# Patient Record
Sex: MALE | Race: WHITE | Employment: OTHER | URBAN - METROPOLITAN AREA
[De-identification: names, ages, dates, MRNs, and addresses within clinical notes are randomized per-mention and may not be internally consistent; named-entity substitution may affect disease eponyms.]

---

## 2017-03-24 ENCOUNTER — HOSPITAL ENCOUNTER (OUTPATIENT)
Dept: PHYSICAL THERAPY | Age: 72
Discharge: HOME | End: 2017-03-24
Payer: MEDICARE

## 2017-03-24 PROCEDURE — 97530 THERAPEUTIC ACTIVITIES: CPT

## 2017-03-24 PROCEDURE — G8984 CARRY CURRENT STATUS: HCPCS

## 2017-03-24 PROCEDURE — 97161 PT EVAL LOW COMPLEX 20 MIN: CPT

## 2017-03-24 PROCEDURE — 97110 THERAPEUTIC EXERCISES: CPT

## 2017-03-24 PROCEDURE — G8985 CARRY GOAL STATUS: HCPCS

## 2017-03-24 NOTE — PROGRESS NOTES
[address] PHYSICAL THERAPY  [address] [place] [address] Kitty Andrew, [address] - Phone: [phone]  Fax: [phone] / 9772 [facility]  Patient Name: Ninoska Walls :    Medical   Diagnosis: Right shoulder pain [M25.511] Treatment Diagnosis: R RTC strain/ impingement   Onset Date: 3/7/2017 AGE: 66 y.o. Referral Source: Vin Rosa MD [facility]): 3/24/2017   Prior Hospitalization: See medical history Provider #: [id number]   Prior Level of Function: Independent w/o R shoulder pain   Comorbidities: Quad bi-pass surgery , L shoulder arthritis, asthma   Medications: Verified on Patient Summary List   The Plan of Care and following information is based on the information from the initial evaluation.   =======================================================================================  Assessment / key information:  Patient is a 66 y.o. male who presents with chief complaint of R shoulder pain. S/S consistent with R RTC pathology. Patient reports he suffered the injury on 3/7/17 when slipping down the steps, a friend grabbed his arm and jerked it to keep him from falling; patient unsure if he fell onto the shoulder. During evaluation, patient demonstrates R shoulder AROM/PROM grossly WNL and symmetrical to L shoulder with pain at eng range in all directions and dec RUE strength. Pertinent positives include + empty can, + shrug sign indicating RTC pathology. Patient's FOTO functional score is 57/100 indicating 43% limitation in functional use of RUE. Patient would benefit from skilled PT services to address these issues and improve RTC strength, progress ROM interventions and address scapulothoracic and postural strengthening within non-painful range of motion.   Thank you for this referral       Objective Data:    Shoulder ROM        AROM         PROM    Left Right   Left Right Flexion 145 142 Flexion 148 153   Extension 65 65 Extension NT NT   Scaption/ 88 Scaption/ 160   ER @ 0 Degrees 51 70 ER @ 0 Degrees 58 88   ER @ 90 Degrees 65 68 ER @ 90 Degrees 75 72   IR @ 90 Degrees 50 60 IR @ 90 Degrees 58 71   IR HBB L1 S1         Functional ER                ======================================================================================  Eval Complexity: History: HIGH Complexity :3+ comorbidities / personal factors will impact the outcome/ POC Exam:MEDIUM Complexity : 3 Standardized tests and measures addressing body structure, function, activity limitation and / or participation in recreation  Presentation: LOW Complexity : Stable, uncomplicated  Clinical Decision Making:MEDIUM Complexity : FOTO score of 26-74Overall Complexity:LOW   Problem List: pain affecting function, decrease ROM, decrease strength, edema affecting function, impaired gait/ balance, decrease ADL/ functional abilitiies, decrease activity tolerance, decrease flexibility/ joint mobility and decrease transfer abilities   Treatment Plan may include any combination of the following: Therapeutic exercise, Therapeutic activities, Neuromuscular re-education, Physical agent/modality, Manual therapy, Patient education, Self Care training, Functional mobility training and Home safety training  Patient / Family readiness to learn indicated by: asking questions, trying to perform skills and interest  Persons(s) to be included in education: patient (P)  Barriers to Learning/Limitations: None  Measures taken:    Patient Goal (s): To improve ROM and strength   Patient self reported health status: good  Rehabilitation Potential: good   Short Term Goals: To be accomplished in  3  Weeks:  1. Patient will be compliant with HEP in order to facilitate progression of therapeutic exercise and improve mobility  2.  Patient will improve R shoulder scaption to >/= 100 degrees with pain no greater than 3/10 in order to improve ability to perform overhead activities  3. Patient will report GROC >/= +2 in order to demonstrate meaningful improvement in RUE function.  Long Term Goals: To be accomplished in  6  Weeks:  1. Patient will be independent with HEP in order to demonstrate ability to perform therapeutic exercises and continue progressing functional mobility upon D/C  2. Patient will demonstrate R shoulder scaption of 140 degrees with pain no greater than 1/10 in order to improve ability to perform overhead activities. 3. Patient will improve FOTO score to 70/100 to demonstrate a meaningful improvement in functional mobility and increased quality of life      Frequency / Duration:   Patient to be seen  2  times per week for 4-6  weeks:  Patient / Caregiver education and instruction: self care, activity modification and exercises  G-Codes (GP): Carry [id number] Current  CK= 40-59%    Goal  CJ= 20-39%. The severity rating is based on the FOTO Score  Therapist Signature: Tj Chopra DPT Date: 3/07/6207   Certification Period: 3/24/17 - 6/23/17 Time: 10:54 AM   ===========================================================================================  I certify that the above Physical Therapy Services are being furnished while the patient is under my care. I agree with the treatment plan and certify that this therapy is necessary. Physician Signature:        Date:       Time:     Please sign and return to [employer] or you may fax the signed copy to [phone]. Thank you.

## 2017-03-24 NOTE — PROGRESS NOTES
PHYSICAL THERAPY - DAILY TREATMENT NOTE    Patient Name: Ras Vega        Date: 3/24/2017  : 1945   YES Patient  Verified  Visit #:   1     Insurance: Payor: Diane Matthew / Plan: [place] MED[place] PART [place] & B / Product Type: Medicare /      In time: 9:05 Out time: 10:05   Total Treatment Time: 60 min     Medicare Time Tracking (below)   Total Timed Codes (min):  20 1:1 Treatment Time:  60     TREATMENT AREA =  R shoulder pain    SUBJECTIVE     Pain Level (on 0 to 10 scale):  0-6/7  / 10   Medication Changes/New allergies or changes in medical history, any new surgeries or procedures? NO    If yes, update Summary List   Subjective Functional Status/Changes:  []  No changes reported     Patient's main complaint: Patient was about to fall down a set of steps 3/7/2017 (denies frequent falls) and someone grabbed his arm to stop him from falling. States he does not recall the incident but states he did not hit his head. Patient was taken to the hospital and stated possible hairline fracture of R shoulder. Patient waited a week and a half then called for orthopedic appointment. Patient had cortisone shot in R shoulder that helped for a couple days and then worse off. Patient reports L shoulder arthritis as well. What reproduces symptoms: Lifting arm, using arm  What alleviates symptoms: resting it by his side    Chief limitations:   Weights, carrying objects    Occupation: Computer work, desk work    Goals: Get arm back to being useful        OBJECTIVE    Physical Therapy Evaluation - Shoulder    A. Cervical Spine  Dermatomes WNL [x]   Comment:  C/S ROM WNL [x]  Comments:     B. Observation:    Posture: [] Poor    [] Fair    [] Good    Describe: Atrophy of muscle tissue? [] Yes  [] No  Location:    Visible Defects?  [] Yes  [] No (ecchymosis, edema, inflammation, deformities)     ROM:  [] Unable to assess at this time     Painful Arc: [x] Yes  [] No   Approximate location in ROM where pain begins:                                            AROM                                                              PROM   Left Right  Left Right   Flexion 145 142 Flexion 148 153   Extension 65 65 Extension NT NT   Scaption/ 88 Scaption/ 160   ER @ 0 Degrees 51 70 ER @ 0 Degrees 58 88   ER @ 90 Degrees 65 68 ER @ 90 Degrees 75 72   IR @ 90 Degrees 50 60 IR @ 90 Degrees 58 71   IR HBB L1 S1      Functional ER          End Feel  Firm:  [x] Yes  [] No     Accessory Motion Testing  Laxity Present? [] Yes   [] No   Direction of laxity:    Strength:   [] Unable to assess at this time                                                                            L (1-5) R (1-5) Pain   Shoulder shrug 5 5 [] Yes   [] No   Abduction 4+ 4+ [x] Yes   [] No   External Rotators 4+ 4- [x] Yes   [] No   Internal Rotators 4+ 4 [x] Yes   [] No   Supraspinatus (scapular plane) 4+ 4 [] Yes   [] No   Serratus Anterior   [] Yes   [] No   Lower Trapezius   [] Yes   [] No   Elbow Flexion 4+ 4- [x] Yes   [] No   Elbow Extension 4+ + [x] Yes   [] No     Most pain with resisted ER    Scapulohumoral Control / Rhythm:  Able to eccentrically lower with good control?  Left: [x] Yes   [] No     Right: [] Yes   [x] No    Palpation  [] Min  [x] Mod  [] Severe    Location: ttp biceps muscle belly at elbow, Lateral shoulder     Impingement:   Neer's Test  [] Pos   [x] Neg   Hawkin's Test  [] Pos   [x] Neg  Cross body adduction [] Pos   [x] Neg    Biceps:   Speed's Test  [] Pos   [x] Neg   Yergason's Test [] Pos   [x] Neg     RTC:    Empty Can  [x] Pos   [] Neg  Shrug Sign  [x] Pos   [] Neg  Drop Arm Test  [] Pos   [] Neg    AC Joint:  AC Joint  [] Pos   [x] Neg  Shear Test  [] Pos   [x] Neg     Others:   SC Joint  [] Pos   [x] Neg  Pectoral Tightness [x] Pos   [] Neg    Other Tests / Comments:     Modalities Rationale:   min [] Estim, type/location:                                      []  att     []  unatt     []  w/US     []  w/ice    [] w/heat    min []  Mechanical Traction: type/lbs                   []  pro   []  sup   []  int   []  cont    []  before manual    []  after manual    min []  Ultrasound, settings/location:      min []  Iontophoresis w/ dexamethasone, location:                                               []  take home patch       []  in clinic   10 min [x]  Ice     []  Heat    location/position: L shoulder supine    min []  Vasopneumatic Device, press/temp:     min []  Other:    [x] Skin assessment post-treatment (if applicable):    [x]  intact    []  redness- no adverse reaction     []redness - adverse reaction:      10 min Therapeutic Exercise:  [x]  See flow sheet   Rationale:      increase ROM, increase strength, improve coordination and increase proprioception to improve the patients ability to perform ADLs and overhead duties with increased ease and decreased pain     10 min Therapeutic Activity: FOTO   Rationale: To assess current functional limitations and review POC    throughout min Patient Education:  YES  Reviewed HEP   []  Progressed/Changed HEP based on: Other Objective/Functional Measures:    See objective above     Post Treatment Pain Level (on 0 to 10) scale:   0  / 10     ASSESSMENT    Assessment/Changes in Function:     Patient History: High (L shoulder arthritis, age, Hx heart surgery)  Examination (low 1-2, mod 3+, high 4+):  Moderate per objective above  Clinical Presentation (low stable or uncomplicated, mod evolving or changing, high unstable or unpredictable): Low stable  Clinical Decision Making (low , mod 26-74, high 1-25): FOTO Mod     []  See Progress Note/Recertification   Patient will continue to benefit from skilled PT services to modify and progress therapeutic interventions, address functional mobility deficits, address ROM deficits, address strength deficits, analyze and address soft tissue restrictions, analyze and cue movement patterns, analyze and modify body mechanics/ergonomics, assess and modify postural abnormalities and instruct in home and community integration to attain remaining goals. to attain remaining goals.    Progress toward goals / Updated goals:    See POC     PLAN    [x]  Upgrade activities as tolerated YES Continue plan of care   []  Discharge due to :    []  Other:      Therapist: Chidi Goodrich DPT    Date: 3/24/2017 Time: 9:07 AM

## 2017-03-28 ENCOUNTER — HOSPITAL ENCOUNTER (OUTPATIENT)
Dept: PHYSICAL THERAPY | Age: 72
Discharge: HOME | End: 2017-03-28
Payer: MEDICARE

## 2017-03-28 PROCEDURE — 97110 THERAPEUTIC EXERCISES: CPT

## 2017-03-28 PROCEDURE — 97140 MANUAL THERAPY 1/> REGIONS: CPT

## 2017-03-28 NOTE — PROGRESS NOTES
PHYSICAL THERAPY - DAILY TREATMENT NOTE    Patient Name: Cesar Duncan        Date: 3/28/2017  : 1945   yes Patient  Verified  Visit #:   2   of     Insurance: Payor: Martín Mcdonald / Plan: [place] MEDICARE PART A & B / Product Type: Medicare /      In time: 900 Out time: 757   Total Treatment Time: 54     Medicare Time Tracking (below)   Total Timed Codes (min):  45 1:1 Treatment Time:  30     TREATMENT AREA =  Right shoulder pain [M25.511]    SUBJECTIVE  Pain Level (on 0 to 10 scale):  3-4  / 10   Medication Changes/New allergies or changes in medical history, any new surgeries or procedures?    no  If yes, update Summary List   Subjective Functional Status/Changes:  []  No changes reported     \"I am sore all over from walking all over [place] this weekend\"          OBJECTIVE  Modalities Rationale:     decrease inflammation and decrease pain to improve patient's ability to carry/lift/reach/perform ADLs with ease     min [] Estim, type/location:                                      []  att     []  unatt     []  w/US     []  w/ice    []  w/heat    min []  Mechanical Traction: type/lbs                   []  pro   []  sup   []  int   []  cont    []  before manual    []  after manual    min []  Ultrasound, settings/location:      min []  Iontophoresis w/ dexamethasone, location:                                               []  take home patch       []  in clinic   10 min [x]  Ice     []  Heat    location/position: Supine with wedge under B LEs      min []  Vasopneumatic Device, press/temp:     min []  Other:    [x] Skin assessment post-treatment (if applicable):    [x]  intact    []  redness- no adverse reaction     []redness - adverse reaction:        45/20 min Therapeutic Exercise:  [x]  See flow sheet   Rationale:      increase ROM and increase strength to improve the patients ability to carry/lift/reach/perform ADLs with ease       10 min Manual Therapy: Post/inf glides to GHJ grade III>IV  IR/ER punches  rhythmic stabs in all planes   STM to R UT/post cap/ pecs/ scap border   Rationale:      decrease pain, increase ROM, increase tissue extensibility, decrease trigger points and increase postural awareness to improve patient's ability to carry/lift/reach/perform ADLs with ease     min Patient Education:  yes  Reviewed HEP   []  Progressed/Changed HEP based on:  Pt ed on importance and benefits of compliance with HEP, core strength/stability and proper posture; pt verbalized understanding         Other Objective/Functional Measures:    VCs + demo to perform proper technique for TE  Initiated TE per flowsheet  c/o min p! at end range AAROM flex/abd; hold stding R SH AAROM abd due to pain and poor mechanics       Post Treatment Pain Level (on 0 to 10) scale:   3-4  / 10     ASSESSMENT  Assessment/Changes in Function:     Progressed there-ex without c/o increase p! []  See Progress Note/Recertification   Patient will continue to benefit from skilled PT services to modify and progress therapeutic interventions, address functional mobility deficits, address ROM deficits, address strength deficits, analyze and address soft tissue restrictions, analyze and cue movement patterns, analyze and modify body mechanics/ergonomics, assess and modify postural abnormalities and instruct in home and community integration to attain remaining goals.    Progress toward goals / Updated goals:    Pt's first visit since IE, no noted progress        PLAN  []  Upgrade activities as tolerated yes Continue plan of care   []  Discharge due to :    []  Other:      Therapist: Moe Sage PTA    Date: 3/28/2017 Time: 2:48 PM     Future Appointments  Date Time Provider Adria Tipton   3/31/2017 9:00 AM Moe Sage PTA [facility]   [facility]   4/6/2017 10:00 AM L[facility]   4/17/2017 2:30 PM [address] [place] [address]   4/20/2017 10:00 AM Moe Sage PTA [facility] 4/25/2017 11:00 AM [facility]   4/27/2017 11:30 AM [facility]   5/24/2017 9:15 AM Remedios Hodgson MD [address] [facility]

## 2017-03-31 ENCOUNTER — HOSPITAL ENCOUNTER (OUTPATIENT)
Dept: PHYSICAL THERAPY | Age: 72
Discharge: HOME | End: 2017-03-31
Payer: MEDICARE

## 2017-03-31 PROCEDURE — 97140 MANUAL THERAPY 1/> REGIONS: CPT

## 2017-03-31 PROCEDURE — 97110 THERAPEUTIC EXERCISES: CPT

## 2017-03-31 NOTE — PROGRESS NOTES
PHYSICAL THERAPY - DAILY TREATMENT NOTE    Patient Name: Kimo President        Date: 3/31/2017  : 1945   yes Patient  Verified  Visit #:   3  of     Insurance: Payor: [place] MEDICARE / Plan: [place] MEDICARE PART A & B / Product Type: Medicare /      In time: 856` Out time: 950   Total Treatment Time: 47     Medicare Time Tracking (below)   Total Timed Codes (min):  44 1:1 Treatment Time:  35     TREATMENT AREA =  Right shoulder pain [M25.511]    SUBJECTIVE  Pain Level (on 0 to 10 scale): 2-3  / 10   Medication Changes/New allergies or changes in medical history, any new surgeries or procedures?    no  If yes, update Summary List   Subjective Functional Status/Changes:  []  No changes reported     \"I am getting better, its moving up better\"         OBJECTIVE  Modalities Rationale:     decrease inflammation and decrease pain to improve patient's ability to carry/lift/reach/perform ADLs with ease     min [] Estim, type/location:                                      []  att     []  unatt     []  w/US     []  w/ice    []  w/heat    min []  Mechanical Traction: type/lbs                   []  pro   []  sup   []  int   []  cont    []  before manual    []  after manual    min []  Ultrasound, settings/location:      min []  Iontophoresis w/ dexamethasone, location:                                               []  take home patch       []  in clinic   10 min [x]  Ice     []  Heat    location/position: Seated    min []  Vasopneumatic Device, press/temp:     min []  Other:    [x] Skin assessment post-treatment (if applicable):    [x]  intact    []  redness- no adverse reaction     []redness - adverse reaction:        34/25 min Therapeutic Exercise:  [x]  See flow sheet   Rationale:      increase ROM and increase strength to improve the patients ability to carry/lift/reach/perform ADLs with ease       10 min Manual Therapy: Post/inf glides to GHJ grade III>IV  IR/ER punches  rhythmic stabs in all planes, and flex/ext/IR/ER quick reversals   STM to R UT/post cap/ pecs/ scap border   Rationale:      decrease pain, increase ROM, increase tissue extensibility, decrease trigger points and increase postural awareness to improve patient's ability to carry/lift/reach/perform ADLs with ease     min Patient Education:  yes  Reviewed HEP   []  Progressed/Changed HEP based on:  Pt ed on importance and benefits of compliance with HEP, core strength/stability and proper posture; pt verbalized understanding     Other Objective/Functional Measures:    VCs + demo to perform proper technique for TE  initiated SA punch, and SL sh abd  without c/o p!    demos good SH/scap stability without UT compensation     Post Treatment Pain Level (on 0 to 10) scale:   0  / 10     ASSESSMENT  Assessment/Changes in Function:     Progressed there-ex without c/o increase p! []  See Progress Note/Recertification   Patient will continue to benefit from skilled PT services to modify and progress therapeutic interventions, address functional mobility deficits, address ROM deficits, address strength deficits, analyze and address soft tissue restrictions, analyze and cue movement patterns, analyze and modify body mechanics/ergonomics, assess and modify postural abnormalities and instruct in home and community integration to attain remaining goals. Progress toward goals / Updated goals: · Short Term Goals: To be accomplished in 3 Weeks:  1. Patient will be compliant with HEP in order to facilitate progression of therapeutic exercise and improve mobility. MET  2. Patient will improve R shoulder scaption to >/= 100 degrees with pain no greater than 3/10 in order to improve ability to perform overhead activities  3. Patient will report GROC >/= +2 in order to demonstrate meaningful improvement in RUE function.      PLAN  []  Upgrade activities as tolerated yes Continue plan of care   []  Discharge due to :    []  Other:      Therapist: Fidel Schilling PTA Date: 3/31/2017 Time: 8:44 AM     Future Appointments  Date Time Provider Adria Tipton   3/31/2017 9:00 AM Fernanda Gramajo PTA [facility]   4/4/2017 10:30 AM Fernanda Gramajo PTA [facility]   4/6/2017 10:00 AM [facility]   4/17/2017 2:30 PM [facility]   4/20/2017 10:00 AM Fernanda Gramajo PTA [facility]   4/25/2017 11:00 AM [facility]   4/27/2017 11:30 AM [facility]   5/24/2017 9:15 AM Amanda Rogers MD [address] [facility]

## 2017-04-04 ENCOUNTER — HOSPITAL ENCOUNTER (OUTPATIENT)
Dept: PHYSICAL THERAPY | Age: 72
Discharge: HOME | End: 2017-04-04
Payer: MEDICARE

## 2017-04-04 PROCEDURE — 97140 MANUAL THERAPY 1/> REGIONS: CPT

## 2017-04-04 NOTE — PROGRESS NOTES
PHYSICAL THERAPY - DAILY TREATMENT NOTE    Patient Name: Vanessa Thurston        Date: 2017  : 1945   yes Patient  Verified  Visit #:   4    Insurance: Payor: Maguepatti Car / Plan: [place] MEDICARE PART A & B / Product Type: Medicare /      In time: 2404 Out time: 1110   Total Treatment Time: 39     Medicare Time Tracking (below)   Total Timed Codes (min):  35 1:1 Treatment Time:  10     TREATMENT AREA =  Right shoulder pain [M25.511]    SUBJECTIVE  Pain Level (on 0 to 10 scale): 1-2 / 10   Medication Changes/New allergies or changes in medical history, any new surgeries or procedures?    no  If yes, update Summary List   Subjective Functional Status/Changes:  []  No changes reported     \"It is moving good, i do not want the surgery\"         OBJECTIVE  Modalities Rationale:     decrease inflammation and decrease pain to improve patient's ability to carry/lift/reach/perform ADLs with ease     min [] Estim, type/location:                                      []  att     []  unatt     []  w/US     []  w/ice    []  w/heat    min []  Mechanical Traction: type/lbs                   []  pro   []  sup   []  int   []  cont    []  before manual    []  after manual    min []  Ultrasound, settings/location:      min []  Iontophoresis w/ dexamethasone, location:                                               []  take home patch       []  in clinic   10 min [x]  Ice     []  Heat    location/position: Seated    min []  Vasopneumatic Device, press/temp:     min []  Other:    [x] Skin assessment post-treatment (if applicable):    [x]  intact    []  redness- no adverse reaction     []redness - adverse reaction:        25/0 min Therapeutic Exercise:  [x]  See flow sheet   Rationale:      increase ROM and increase strength to improve the patients ability to carry/lift/reach/perform ADLs with ease       10 min Manual Therapy: Post/inf glides to GHJ grade III>IV  Scap framing, and STM to R UT, scap border  AAROM in L SLing   Rationale:      decrease pain, increase ROM, increase tissue extensibility, decrease trigger points and increase postural awareness to improve patient's ability to carry/lift/reach/perform ADLs with ease     min Patient Education:  yes  Reviewed HEP   []  Progressed/Changed HEP based on:  Pt ed on importance and benefits of compliance with HEP, core strength/stability and proper posture; pt verbalized understanding     Other Objective/Functional Measures:    VCs + demo to perform proper technique for TE  initiated #1 for SL Abd without c/o p! PROM WNL with no c/o P! Post Treatment Pain Level (on 0 to 10) scale:   0  / 10     ASSESSMENT  Assessment/Changes in Function:     Progressed there-ex without c/o increase p! []  See Progress Note/Recertification   Patient will continue to benefit from skilled PT services to modify and progress therapeutic interventions, address functional mobility deficits, address ROM deficits, address strength deficits, analyze and address soft tissue restrictions, analyze and cue movement patterns, analyze and modify body mechanics/ergonomics, assess and modify postural abnormalities and instruct in home and community integration to attain remaining goals. Progress toward goals / Updated goals: · Short Term Goals: To be accomplished in 3 Weeks:  1. Patient will be compliant with HEP in order to facilitate progression of therapeutic exercise and improve mobility. MET  2. Patient will improve R shoulder scaption to >/= 100 degrees with pain no greater than 3/10 in order to improve ability to perform overhead activities  3. Patient will report GROC >/= +2 in order to demonstrate meaningful improvement in RUE function.      PLAN  []  Upgrade activities as tolerated yes Continue plan of care   []  Discharge due to :    []  Other:      Therapist: Tracie Sharma PTA    Date: 4/4/2017 Time: 1:27 PM     Future Appointments  Date Time Provider Adria Tipton   4/6/2017 10:00 AM [facility]   4/17/2017 2:30 PM [facility]   4/20/2017 10:00 AM Damián Zelaya PTA [facility]   4/25/2017 11:00 AM [facility]   4/27/2017 11:30 AM [facility]   5/24/2017 9:15 AM Lorraine Dominguez MD [facility]

## 2017-04-06 ENCOUNTER — HOSPITAL ENCOUNTER (OUTPATIENT)
Dept: PHYSICAL THERAPY | Age: 72
Discharge: HOME | End: 2017-04-06
Payer: MEDICARE

## 2017-04-06 PROCEDURE — 97110 THERAPEUTIC EXERCISES: CPT

## 2017-04-06 NOTE — PROGRESS NOTES
PHYSICAL THERAPY - DAILY TREATMENT NOTE    Patient Name: Katarzyna Lopez        Date: 2017  : 1945   YES Patient  Verified  Visit #:   5     Insurance: Payor: Mily Rodrigues / Plan: [place] MEDICARE PART A & B / Product Type: Medicare /      In time: 10:00 Out time: 10:0   Total Treatment Time: 50     Medicare Time Tracking (below)   Total Timed Codes (min):  40 1:1 Treatment Time:  25     TREATMENT AREA =  Right shoulder pain [M25.511]    SUBJECTIVE    Pain Level (on 0 to 10 scale):  0  / 10   Medication Changes/New allergies or changes in medical history, any new surgeries or procedures? NO     If yes, update Summary List   Subjective Functional Status/Changes:  []  No changes reported     \"I am feeling really good. And I can do more than I used to be able to do with this R arm. \"          OBJECTIVE    Modalities Rationale:   palliative   min [] Estim, type/location:                                      []  att     []  unatt     []  w/US     []  w/ice    []  w/heat    min []  Mechanical Traction: type/lbs                   []  pro   []  sup   []  int   []  cont    []  before manual    []  after manual    min []  Ultrasound, settings/location:      min []  Iontophoresis w/ dexamethasone, location:                                               []  take home patch       []  in clinic   10 min [x]  Ice     []  Heat    location/position:     min []  Vasopneumatic Device, press/temp:     min []  Other:    [x] Skin assessment post-treatment (if applicable):   [x]  intact    []  redness- no adverse reaction     []redness - adverse reaction:    24 min Therapeutic Exercise:  [x]  See flow sheet   Rationale:      increase ROM and increase strength to improve the patients ability to perform [place] ADL's with improved periscapular stability.     6 min Manual Therapy: PROM all directionss with manual distraction and posterior grade 3-4 mob's   Rationale:      decrease pain, increase ROM and increase tissue extensibility to improve patient's ability to perform [place] ADL's with improved flexibility. min Patient Education:  YES  Reviewed HEP   []  Progressed/Changed HEP based on:   Patient reports compliance     Other Objective/Functional Measures:    Pt presents to PT with need for lots of vc's with intiated scapular stabilization exercises to perform as part of his HEP while away in [place]. Pt demonstrates symmetrical PROM by the end of manual interventions. Post Treatment Pain Level (on 0 to 10) scale:   0  / 10     ASSESSMENT    Assessment/Changes in Function:     Pt presents to PT with growing efficiency with AROM and need for mostly strengthening especially in OKC. []  See Progress Note/Recertification   Patient will continue to benefit from skilled PT services to modify and progress therapeutic interventions, address functional mobility deficits, address ROM deficits, address strength deficits, analyze and address soft tissue restrictions, analyze and cue movement patterns and analyze and modify body mechanics/ergonomics to attain remaining goals. Progress toward goals / Updated goals: · Short Term Goals: To be accomplished in 3 Weeks:  1. Patient will be compliant with HEP in order to facilitate progression of therapeutic exercise and improve mobility. Compliant, MET. 2. Patient will improve R shoulder scaption to >/= 100 degrees with pain no greater than 3/10 in order to improve ability to perform overhead activities. 140 deg. MET. 3. Patient will report GROC >/= +2 in order to demonstrate meaningful improvement in RUE function. +5. MET.      PLAN    [x]  Upgrade activities as tolerated YES Continue plan of care   []  Discharge due to :    []  Other:      Therapist: Aidan Angeles    Date: 4/6/2017 Time: 10:03 AM     Future Appointments  Date Time Provider Adria Tipton   4/17/2017 2:30 PM [address] [place] [address]   4/20/2017 10:00 AM [facility]   4/25/2017 11:00 AM [facility]   4/27/2017 11:30 AM [facility]   5/24/2017 9:15 AM Roxane Palacios MD [address] [place] [address]

## 2017-04-17 ENCOUNTER — HOSPITAL ENCOUNTER (OUTPATIENT)
Dept: PHYSICAL THERAPY | Age: 72
Discharge: HOME | End: 2017-04-17
Payer: MEDICARE

## 2017-04-17 PROCEDURE — 97110 THERAPEUTIC EXERCISES: CPT

## 2017-04-17 NOTE — PROGRESS NOTES
PHYSICAL THERAPY - DAILY TREATMENT NOTE    Patient Name: Kerrie Abbasi        Date: 2017  : 1945   YES Patient  Verified  Visit #:     Insurance: Payor: Ru Coffman / Plan: [place] MEDICARE PART A & B / Product Type: Medicare /      In time: 2:40 Out time: 3:20   Total Treatment Time: 40     Medicare Time Tracking (below)   Total Timed Codes (min):  30 1:1 Treatment Time:  30     TREATMENT AREA =  Right shoulder pain [M25.511]    SUBJECTIVE    Pain Level (on 0 to 10 scale):  0  / 10   Medication Changes/New allergies or changes in medical history, any new surgeries or procedures? NO     If yes, update Summary List   Subjective Functional Status/Changes:  []  No changes reported     \"I did all my exercises while I was away. The doctor said he was happy with my AROM, but he still wants an MRI to see if I need surgery \"          OBJECTIVE    Modalities Rationale:  palliative      min [] Estim, type/location:                                      []  att     []  unatt     []  w/US     []  w/ice    []  w/heat    min []  Mechanical Traction: type/lbs                   []  pro   []  sup   []  int   []  cont    []  before manual    []  after manual    min []  Ultrasound, settings/location:      min []  Iontophoresis w/ dexamethasone, location:                                               []  take home patch       []  in clinic   10 min [x]  Ice     []  Heat    location/position: R shoulder    min []  Vasopneumatic Device, press/temp:     min []  Other:    [x] Skin assessment post-treatment (if applicable):   []  intact    []  redness- no adverse reaction     []redness - adverse reaction:    30 min Therapeutic Exercise:  [x]  See flow sheet   Rationale:      increase ROM and increase strength to improve the patients ability to perform [place] ADL's with improved periscapular stability.       min Patient Education:  YES  Reviewed HEP   []  Progressed/Changed HEP based on:   Patient reports compliance     Other Objective/Functional Measures:    Initiated many more strengthening exercise include prone stabilization ones and OKC standing ones. See flowsheet for more details. Post Treatment Pain Level (on 0 to 10) scale:   0  / 10     ASSESSMENT    Assessment/Changes in Function:     Pt presents to PT with only discomfort with repeated OKC elevation to 90 deg. Pt symptoms dissipate with rest and pt is progressing in periscapular and OKC strength appropriately. []  See Progress Note/Recertification   Patient will continue to benefit from skilled PT services to modify and progress therapeutic interventions, address functional mobility deficits, address ROM deficits, address strength deficits, analyze and address soft tissue restrictions, analyze and cue movement patterns, analyze and modify body mechanics/ergonomics and assess and modify postural abnormalities to attain remaining goals. Progress toward goals / Updated goals: · Short Term Goals: To be accomplished in 3 Weeks:  1. Patient will be compliant with HEP in order to facilitate progression of therapeutic exercise and improve mobility. Compliant, MET. 2. Patient will improve R shoulder scaption to >/= 100 degrees with pain no greater than 3/10 in order to improve ability to perform overhead activities. 140 deg. MET. 3. Patient will report GROC >/= +2 in order to demonstrate meaningful improvement in RUE function. +5. MET.      PLAN    [x]  Upgrade activities as tolerated YES Continue plan of care   []  Discharge due to :    []  Other:      Therapist: Daria Evans    Date: 4/17/2017 Time: 2:45 PM     Future Appointments  Date Time Provider Adria Tipton   4/20/2017 10:00 AM [facility]   4/25/2017 11:00 AM [facility]   4/27/2017 11:30 AM [facility]   5/24/2017 9:15 AM Lorraine Dominguez MD [address] [facility]

## 2017-04-20 ENCOUNTER — HOSPITAL ENCOUNTER (OUTPATIENT)
Dept: PHYSICAL THERAPY | Age: 72
Discharge: HOME | End: 2017-04-20
Payer: MEDICARE

## 2017-04-20 PROCEDURE — 97110 THERAPEUTIC EXERCISES: CPT

## 2017-04-20 NOTE — PROGRESS NOTES
PHYSICAL THERAPY - DAILY TREATMENT NOTE    Patient Name: Lesia Combs        Date: 2017  : 1945   yes Patient  Verified  Visit #:     Insurance: Payor: Ruthie Ryan / Plan: [place] MEDICARE PART A & B / Product Type: Medicare /      In time: 1010 Out time: 1100   Total Treatment Time: 50     Medicare Time Tracking (below)   Total Timed Codes (min): 50 1:1 Treatment Time:  15     TREATMENT AREA =  Right shoulder pain [M25.511]    SUBJECTIVE  Pain Level (on 0 to 10 scale): 1-2  10   Medication Changes/New allergies or changes in medical history, any new surgeries or procedures?    no  If yes, update Summary List   Subjective Functional Status/Changes:  []  No changes reported     \"It is moving good, i do not want the surgery\"         OBJECTIVE  Modalities Rationale:     decrease inflammation and decrease pain to improve patient's ability to carry/lift/reach/perform ADLs with ease     min [] Estim, type/location:                                      []  att     []  unatt     []  w/US     []  w/ice    []  w/heat    min []  Mechanical Traction: type/lbs                   []  pro   []  sup   []  int   []  cont    []  before manual    []  after manual    min []  Ultrasound, settings/location:      min []  Iontophoresis w/ dexamethasone, location:                                               []  take home patch       []  in clinic   10 min [x]  Ice     []  Heat    location/position: upright on bed with wedge under LEs    min []  Vasopneumatic Device, press/temp:     min []  Other:    [x] Skin assessment post-treatment (if applicable):    [x]  intact    []  redness- no adverse reaction     []redness - adverse reaction:        40/15 min Therapeutic Exercise:  [x]  See flow sheet   Rationale:      increase ROM and increase strength to improve the patients ability to carry/lift/reach/perform ADLs with ease          min Patient Education:  yes  Reviewed HEP   []  Progressed/Changed HEP based on: Pt ed on importance and benefits of compliance with HEP, core strength/stability and proper posture; pt verbalized understanding     Other Objective/Functional Measures:    VCs + demo to perform proper technique for TE  increased to GTB with scap stabs and 3# with SL Abd and ER without c/o p!  R SH AROM flex=164 degs/abd=152 degs  attempted prone ys, pt unable at this time due to decrease AROM and strength   Post Treatment Pain Level (on 0 to 10) scale:   0  / 10     ASSESSMENT  Assessment/Changes in Function:     Progressed there-ex without c/o increase p! []  See Progress Note/Recertification   Patient will continue to benefit from skilled PT services to modify and progress therapeutic interventions, address functional mobility deficits, address ROM deficits, address strength deficits, analyze and address soft tissue restrictions, analyze and cue movement patterns, analyze and modify body mechanics/ergonomics, assess and modify postural abnormalities and instruct in home and community integration to attain remaining goals. Progress toward goals / Updated goals: · Short Term Goals: To be accomplished in 3 Weeks:  1. Patient will be compliant with HEP in order to facilitate progression of therapeutic exercise and improve mobility. MET  2. Patient will improve R shoulder scaption to >/= 100 degrees with pain no greater than 3/10 in order to improve ability to perform overhead activities. MET=164 AROM  3. Patient will report GROC >/= +2 in order to demonstrate meaningful improvement in RUE function.  MET=+5     PLAN  []  Upgrade activities as tolerated yes Continue plan of care   []  Discharge due to :    []  Other:      Therapist: Eduard East PTA    Date: 4/20/2017 Time: 1:27 PM     Future Appointments  Date Time Provider Adria Tipton   4/25/2017 11:00 AM [address]   4/27/2017 11:30 AM [address]   5/1/2017 9:00 AM Eduard East PTA [address]   5/4/2017 9:00 AM [facility]   5/9/2017 9:00 AM Anuel Bruno PTA [facility]   5/12/2017 2:30 PM [facility]   5/16/2017 2:30 PM [facility]   5/19/2017 3:00 PM [facility]   5/24/2017 9:15 AM Vanessa Ríos MD [address] [facility]   5/24/2017 1:30 PM Anuel Bruno PTA [facility]   5/26/2017 9:00 AM Danielle Walters [facility]   5/31/2017 10:30 AM Anuel Bruno PTA [facility]

## 2017-04-25 ENCOUNTER — HOSPITAL ENCOUNTER (OUTPATIENT)
Dept: PHYSICAL THERAPY | Age: 72
Discharge: HOME | End: 2017-04-25
Payer: MEDICARE

## 2017-04-25 PROCEDURE — G8985 CARRY GOAL STATUS: HCPCS

## 2017-04-25 PROCEDURE — 97530 THERAPEUTIC ACTIVITIES: CPT

## 2017-04-25 PROCEDURE — G8986 CARRY D/C STATUS: HCPCS

## 2017-04-25 PROCEDURE — 97110 THERAPEUTIC EXERCISES: CPT

## 2017-04-25 NOTE — PROGRESS NOTES
[address] PHYSICAL THERAPY  [address] [place] [address] Richard Andrew, [address] - Phone: [phone]  Fax: [phone] OF CARE/RECERTIFICATION FOR PHYSICAL THERAPY          Patient Name: Gama Langley :    Treatment/Medical Diagnosis: Right shoulder pain [M25.511]   Onset Date: 3/7/17    Referral Source: Louisa Newell MD [facility]): 3/24/17   Prior Hospitalization: See Medical History Provider #: [id number]   Prior Level of Function: Independent w/o R shoulder pain   Comorbidities: Quad bi-pass surgery 2014, L shoulder arthritis, asthma   Medications: Verified on Patient Summary List   Visits from [facility]: 8 Missed Visits: 0     Goal/Measure of Progress Goal Met? 1. Patient will be compliant with HEP in order to facilitate progression of therapeutic exercise and improve mobility   Status at last Eval: NA Current Status: compliant yes   2. Patient will improve R shoulder scaption to >/= 100 degrees with pain no greater than 3/10 in order to improve ability to perform overhead activities   Status at last Eval: 88 Current Status: 164 yes   3. Patient will report GROC >/= +2 in order to demonstrate meaningful improvement in RUE function. Status at last Eval: NA Current Status: +5 yes     Therapy has consisted of periscapular strengthening exercises, AROM and AAROM exercises active warm-up on the UBE and ER strengthening exercises. Key Functional Changes/Progress:  FOTO score increased to 81 now indicating 19% functional limitations at this itme. The pt now demonstrates full flexion AROM B, abduction AROM to 164 deg, ER to 75 deg and IR limited to the sacrum which is symmetrical B. The pt demonstrates 4- ER MMT on the R and 5 for IR; however, the pt is still limited in flexion/abduction MMT to 3+ each. The pt can lift 5# overhead without any pain as well.     Problem List: pain affecting function, decrease ROM, decrease strength, decrease ADL/ functional abilitiies, decrease activity tolerance and decrease flexibility/ joint mobility   Treatment Plan may include any combination of the following: Therapeutic exercise, Therapeutic activities, Neuromuscular re-education, Physical agent/modality, Gait/balance training, Manual therapy, Patient education, Self Care training and Functional mobility training   Goals for this certification period include and are to be achieved in   3-4  weeks:  1. Patient will be independent with HEP in order to demonstrate ability to perform therapeutic exercises and continue progressing functional mobility upon D/C.  2.   The patient will demonstrate at least 4+ ER MMT to increase stability with [place] ADL's.  3.   The patient will demonstrate at least 4+ flexion MMT to increase efficiency with household ADL's. Frequency / Duration:   Patient to be seen   2-3   times per week for   4-6    weeks:  G-Codes (GP): Carry:  [id number] Goal  CJ= 20-39%   D/C  CI= 1-19%. The severity rating is based on the FOTO Score  Assessments/Recommendations: The patient would benefit from continued skilled interventions to restore end-range AROM and strength required for efficient performance of ADL's. If you have any questions/comments please contact us directly at [phone]. Thank you for allowing us to assist in the care of your patient. Therapist Signature: Rossy Hubbard DPT Date: 6/93/2279   Certification Period:  Reporting Period: 3/24/17 - 6/23/17  3/24/17 - 4/25/17 Time: 12:59 PM   NOTE TO PHYSICIAN:  PLEASE COMPLETE THE ORDERS BELOW AND FAX TO   [facility]: [phone].   If you are unable to process this request in 24 hours please contact our office: [phone].    ___ I have read the above report and request that my patient continue as recommended.   ___ I have read the above report and request that my patient continue therapy with the following changes/special instructions: ________________________________________________   ___ I have read the above report and request that my patient be discharged from therapy.      Physician Signature:        Date:       Time:

## 2017-04-25 NOTE — PROGRESS NOTES
PHYSICAL THERAPY - DAILY TREATMENT NOTE    Patient Name: Cristina Hull        Date: 2017  : 1945   yes Patient  Verified  Visit #:     Insurance: Payor: Josi Kirby / Plan: [place] MED[place] & B / Product Type: Medicare /      In time: 1055 Out time: 1140   Total Treatment Time: 45     Medicare Time Tracking (below)   Total Timed Codes (min): 35 1:1 Treatment Time:  23     TREATMENT AREA =  Right shoulder pain [M25.511]    SUBJECTIVE  Pain Level (on 0 to 10 scale): 0 / 10   Medication Changes/New allergies or changes in medical history, any new surgeries or procedures?    no  If yes, update Summary List   Subjective Functional Status/Changes:  []  No changes reported     \"I haven't been able to lift the grandkids up yet\"       OBJECTIVE  Modalities Rationale:     decrease inflammation and decrease pain to improve patient's ability to carry/lift/reach/perform ADLs with ease     min [] Estim, type/location:                                      []  att     []  unatt     []  w/US     []  w/ice    []  w/heat    min []  Mechanical Traction: type/lbs                   []  pro   []  sup   []  int   []  cont    []  before manual    []  after manual    min []  Ultrasound, settings/location:      min []  Iontophoresis w/ dexamethasone, location:                                               []  take home patch       []  in clinic   10 min [x]  Ice     []  Heat    location/position: upright on bed with wedge under LEs    min []  Vasopneumatic Device, press/temp:     min []  Other:    [x] Skin assessment post-treatment (if applicable):    [x]  intact    []  redness- no adverse reaction     []redness - adverse reaction:        27/15 min Therapeutic Exercise:  [x]  See flow sheet   Rationale:      increase ROM and increase strength to improve the patients ability to carry/lift/reach/perform ADLs with ease       8 min Therapeutic Activity: Foto assessment  25# OH B UE  5# R UE   Rationale: increase ROM and increase strength to improve the patients ability to carry/lift/reach/perform ADLs with ease         min Patient Education:  yes  Reviewed HEP   []  Progressed/Changed HEP based on:  Pt ed on importance and benefits of compliance with HEP, core strength/stability and proper posture; pt verbalized understanding     Other Objective/Functional Measures:    VCs + demo to perform proper technique for TE  functional status summery=81 (IE=57/goal=70)  demos proper tech and no increase p! with 25# OH with BUEs, and no increase p! with 5# OH with R UE     Post Treatment Pain Level (on 0 to 10) scale:   0  / 10     ASSESSMENT  Assessment/Changes in Function:     Progressed there-ex without c/o increase p! []  See Progress Note/Recertification   Patient will continue to benefit from skilled PT services to modify and progress therapeutic interventions, address functional mobility deficits, address ROM deficits, address strength deficits, analyze and address soft tissue restrictions, analyze and cue movement patterns, analyze and modify body mechanics/ergonomics, assess and modify postural abnormalities and instruct in home and community integration to attain remaining goals. Progress toward goals / Updated goals: Mobility   Goal  CI= 1-19%  D/C  CI= 1-19%. The severity rating is based on the FOTO Score    · Short Term Goals: To be accomplished in 3 Weeks:  1. Patient will be compliant with HEP in order to facilitate progression of therapeutic exercise and improve mobility. MET  2. Patient will improve R shoulder scaption to >/= 100 degrees with pain no greater than 3/10 in order to improve ability to perform overhead activities. MET=164 AROM  3. Patient will report GROC >/= +2 in order to demonstrate meaningful improvement in RUE function. MET=+5  · Long Term Goals: To be accomplished in 6 Weeks:  1.  Patient will be independent with HEP in order to demonstrate ability to perform therapeutic exercises and continue progressing functional mobility upon D/C  2. Patient will demonstrate R shoulder scaption of 140 degrees with pain no greater than 1/10 in order to improve ability to perform overhead activities. progressing, max p! 2/10 with R SH scap ~ 164 degs  3. Patient will improve FOTO score to 70/100 to demonstrate a meaningful improvement in functional mobility and increased quality of life.  MET; 81      PLAN  []  Upgrade activities as tolerated yes Continue plan of care   []  Discharge due to :    []  Other:      Therapist: Devin Shea PTA    Date: 4/25/2017 Time: 11:26 PM     Future Appointments  Date Time Provider Adria Tipton   4/27/2017 11:30 AM Devin Shea PTA [facility]   5/1/2017 9:00 AM Devin Shea PTA [facility]   5/4/2017 9:00 AM [facility]   5/9/2017 9:00 AM Devin Shea PTA [facility]   5/12/2017 2:30 PM [facility]   5/16/2017 2:30 PM Jose Evans [facility]   5/19/2017 3:00 PM [facility]   5/24/2017 9:15 AM Leroy Lozano MD [facility]   5/24/2017 1:30 PM Devin Shea PTA [facility]   5/26/2017 9:00 AM Devin Shea [facility]   5/31/2017 10:30 AM Devin Shea [facility]

## 2017-04-27 ENCOUNTER — HOSPITAL ENCOUNTER (OUTPATIENT)
Dept: PHYSICAL THERAPY | Age: 72
Discharge: HOME | End: 2017-04-27
Payer: MEDICARE

## 2017-04-27 PROCEDURE — 97110 THERAPEUTIC EXERCISES: CPT

## 2017-04-27 PROCEDURE — G8985 CARRY GOAL STATUS: HCPCS

## 2017-04-27 PROCEDURE — G8984 CARRY CURRENT STATUS: HCPCS

## 2017-04-27 NOTE — PROGRESS NOTES
PHYSICAL THERAPY - DAILY TREATMENT NOTE    Patient Name: Gama Langley        Date: 2017  : 1945   yes Patient  Verified  Visit #:     Insurance: Payor: Pedro Do / Plan: [place] MEDICARE PART A & B / Product Type: Medicare /      In time: 4384 Out time: 7739   Total Treatment Time: 50     Medicare Time Tracking (below)   Total Timed Codes (min): 40 1:1 Treatment Time:  15     TREATMENT AREA =  Right shoulder pain [M25.511]    SUBJECTIVE  Pain Level (on 0 to 10 scale): 0 / 10   Medication Changes/New allergies or changes in medical history, any new surgeries or procedures?    no  If yes, update Summary List   Subjective Functional Status/Changes:  []  No changes reported     Pt with reports of soreness no c/o p!        OBJECTIVE  Modalities Rationale:     decrease inflammation and decrease pain to improve patient's ability to carry/lift/reach/perform ADLs with ease     min [] Estim, type/location:                                      []  att     []  unatt     []  w/US     []  w/ice    []  w/heat    min []  Mechanical Traction: type/lbs                   []  pro   []  sup   []  int   []  cont    []  before manual    []  after manual    min []  Ultrasound, settings/location:      min []  Iontophoresis w/ dexamethasone, location:                                               []  take home patch       []  in clinic   10 min [x]  Ice     []  Heat    location/position: upright on bed with wedge under LEs    min []  Vasopneumatic Device, press/temp:     min []  Other:    [x] Skin assessment post-treatment (if applicable):    [x]  intact    []  redness- no adverse reaction     []redness - adverse reaction:        40/15 min Therapeutic Exercise:  [x]  See flow sheet   Rationale:      increase ROM and increase strength to improve the patients ability to carry/lift/reach/perform ADLs with ease            min Patient Education:  yes  Reviewed HEP   []  Progressed/Changed HEP based on:  Pt ed on importance and benefits of compliance with HEP, core strength/stability and proper posture; pt verbalized understanding     Other Objective/Functional Measures:    VCs + demo to perform proper technique for TE  R SH strength flex/abd=3+/5, and ER=4-/5  demos proper ER AROM SL with 3# without VCs, not c/o min popping at end range     Post Treatment Pain Level (on 0 to 10) scale:   0  / 10     ASSESSMENT  Assessment/Changes in Function:     Progressed there-ex without c/o increase p! []  See Progress Note/Recertification   Patient will continue to benefit from skilled PT services to modify and progress therapeutic interventions, address functional mobility deficits, address ROM deficits, address strength deficits, analyze and address soft tissue restrictions, analyze and cue movement patterns, analyze and modify body mechanics/ergonomics, assess and modify postural abnormalities and instruct in home and community integration to attain remaining goals. Progress toward goals / Updated goals:  Carry  Current  CK= 40-59%    Goal  CJ= 20-39%. The severity rating is based on the Other R SH flex strength = 4+/5  · Goals for this certification period include and are to be achieved in  3-4  weeks:  1. Patient will be independent with HEP in order to demonstrate ability to perform therapeutic exercises and continue progressing functional mobility upon D/C.  2. The patient will demonstrate at least 4+ ER MMT to increase stability with OH ADL's. progressing, R [facility] ER strength=4-/5  3.  The patient will demonstrate at least 4+ flexion MMT to increase efficiency with household ADL's. progressing, R  flex strength=3+/5     PLAN  []  Upgrade activities as tolerated yes Continue plan of care   []  Discharge due to :    []  Other:      Therapist: Davian Hardy PTA    Date: 4/27/2017 Time: 12:26 PM     Future Appointments  Date Time Provider Adria Tipton   5/1/2017 9:00 AM Davian Hardy PTA [facility] 5/4/2017 9:00 AM [facility] AT [place] [address]   5/9/2017 9:00 AM Davian Hardy PTA [facility] AT [place] [address]   5/12/2017 2:30 PM [facility] AT [address]   5/16/2017 2:30 PM [facility] AT [place] [address]   5/19/2017 3:00 PM [facility] AT [address]   5/24/2017 9:15 AM Mohini Oh MD [facility]   5/24/2017 1:30 PM Davian Hardy PTA [facility] AT [place] [address]   5/26/2017 9:00 AM Olu [facility] AT [address]   5/31/2017 10:30 AM Davian Hardy PTA [facility] AT [address]

## 2017-05-01 ENCOUNTER — HOSPITAL ENCOUNTER (OUTPATIENT)
Dept: PHYSICAL THERAPY | Age: 72
Discharge: HOME | End: 2017-05-01
Payer: MEDICARE

## 2017-05-01 PROCEDURE — G8984 CARRY CURRENT STATUS: HCPCS

## 2017-05-01 PROCEDURE — G8985 CARRY GOAL STATUS: HCPCS

## 2017-05-01 PROCEDURE — 97110 THERAPEUTIC EXERCISES: CPT

## 2017-05-01 NOTE — PROGRESS NOTES
PHYSICAL THERAPY - DAILY TREATMENT NOTE    Patient Name: Jessica Zambrano        Date: 2017  : 1945   yes Patient  Verified  Visit #:   10    Insurance: Payor: Naomie Haro / Plan: [place] MEDICARE PART A & B / Product Type: Medicare /      In time: 900 Out time: 950   Total Treatment Time: 50     Medicare Time Tracking (below)   Total Timed Codes (min): 40 1:1 Treatment Time:  15     TREATMENT AREA =  Right shoulder pain [M25.511]    SUBJECTIVE  Pain Level (on 0 to 10 scale): 0 / 10   Medication Changes/New allergies or changes in medical history, any new surgeries or procedures?    no  If yes, update Summary List   Subjective Functional Status/Changes:  []  No changes reported     \"Dr. Payal Lopez said I had 2 tears in the [facility] so he wants to do surgery, bc he said after Aug. he would have to do a replacement, so I go in May 11 for surgery\"     OBJECTIVE  Modalities Rationale:     decrease inflammation and decrease pain to improve patient's ability to carry/lift/reach/perform ADLs with ease     min [] Estim, type/location:                                      []  att     []  unatt     []  w/US     []  w/ice    []  w/heat    min []  Mechanical Traction: type/lbs                   []  pro   []  sup   []  int   []  cont    []  before manual    []  after manual    min []  Ultrasound, settings/location:      min []  Iontophoresis w/ dexamethasone, location:                                               []  take home patch       []  in clinic   10 min [x]  Ice     []  Heat    location/position: upright on bed with wedge under LEs    min []  Vasopneumatic Device, press/temp:     min []  Other:    [x] Skin assessment post-treatment (if applicable):    [x]  intact    []  redness- no adverse reaction     []redness - adverse reaction:        40/15 min Therapeutic Exercise:  [x]  See flow sheet   Rationale:      increase ROM and increase strength to improve the patients ability to carry/lift/reach/perform ADLs with ease            min Patient Education:  yes  Reviewed HEP   []  Progressed/Changed HEP based on:  Pt ed on importance and benefits of compliance with HEP, core strength/stability and proper posture; pt verbalized understanding     Other Objective/Functional Measures:    VCs + demo to perform proper technique for TE  initiated body blade flex and IR/ER without c/o p!  c/o fatigue with SH flex body blade  VCs to perform SH abd AROM with thumbs up for proper tech  instructed to maintain compliance with HEP everyday prior to surgery;pt verbalized understanding       Post Treatment Pain Level (on 0 to 10) scale:   0  / 10     ASSESSMENT  Assessment/Changes in Function:     Progressed there-ex without c/o increase p!  demos good scap stability and no UT compensation with AROM     []  See Progress Note/Recertification   Patient will continue to benefit from skilled PT services to modify and progress therapeutic interventions, address functional mobility deficits, address ROM deficits, address strength deficits, analyze and address soft tissue restrictions, analyze and cue movement patterns, analyze and modify body mechanics/ergonomics, assess and modify postural abnormalities and instruct in home and community integration to attain remaining goals. Progress toward goals / Updated goals:  Carry  Current  CK= 40-59%    Goal  CJ= 20-39%. The severity rating is based on the Other R SH flex strength = 2+/2ADZAA for this certification period include and are to be achieved in  3-4  weeks:  1. Patient will be independent with HEP in order to demonstrate ability to perform therapeutic exercises and continue progressing functional mobility upon D/C.  2. The patient will demonstrate at least 4+ ER MMT to increase stability with OH ADL's. progressing, R [facility] ER strength=4-/5  3.  The patient will demonstrate at least 4+ flexion MMT to increase efficiency with household ADL's. progressing, R SH flex strength=3+/5 PLAN  []  Upgrade activities as tolerated yes Continue plan of care   []  Discharge due to :    []  Other:      Therapist: Sudhir Hawkins PTA    Date: 5/1/2017 Time: 12:26 PM     Future Appointments  Date Time Provider Adria Tipton   5/24/2017 9:15 AM Keeley Khan MD [facility] [facility]

## 2017-05-02 ENCOUNTER — HOSPITAL ENCOUNTER (OUTPATIENT)
Dept: PHYSICAL THERAPY | Age: 72
Discharge: HOME | End: 2017-05-02
Payer: MEDICARE

## 2017-05-02 PROCEDURE — 97110 THERAPEUTIC EXERCISES: CPT

## 2017-05-02 PROCEDURE — G8986 CARRY D/C STATUS: HCPCS

## 2017-05-02 PROCEDURE — G8985 CARRY GOAL STATUS: HCPCS

## 2017-05-02 NOTE — PROGRESS NOTES
[address] PHYSICAL THERAPY  [address] [place] [address] Richard Andrew, [address] [employer] [address] - Phone: [phone]  Fax: [phone] SUMMARY FOR PHYSICAL THERAPY          Patient Name: Gama Langley :    Treatment/Medical Diagnosis: Right shoulder pain [M25.511]   Onset Date: 3/7/17    Referral Source: Louisa Newell MD [facility]): 3/24/17   Prior Hospitalization: See Medical History Provider #: [id number]   Prior Level of Function: Independent w/o R shoulder pain   Comorbidities:    Medications: Verified on Patient Summary List   Visits from [facility]: 11 Missed Visits: 0     Goal/Measure of Progress Goal Met? 1. Patient will be independent with HEP in order to demonstrate ability to perform therapeutic exercises and continue progressing functional mobility upon D/C. Status at last Eval: NA Current Status: independent yes   2. The patient will demonstrate at least 4+ ER MMT to increase stability with OH ADL's. Status at last Eval: 4- Current Status: 4+ yes   3. The patient will demonstrate at least 4+ flexion MMT to increase efficiency with household ADL's. Status at last Eval: 4- Current Status: 4+ yes     Key Functional Changes/Progress:  FOTO score 81 indicating 19% limitation in functional ability. Pt only has minimal MMT deficits (4+) in all planes or higher. Pt also has full AROM with no pain. G-Codes (GP): Carry:  [id number] Goal  CJ= 20-39%  [id number] D/C  CJ= 20-39%. The severity rating is based on the Other flexion MMT    Assessments/Recommendations: Discontinue therapy. Progressing towards or have reached established goals. If you have any questions/comments please contact us directly at [phone]. Thank you for allowing us to assist in the care of your patient.     Therapist Signature: Darvin Samuels DPT Date: 2017    Reporting Period: 17 - 17 Time: 1:01 PM ===========================================================================================  NOTE TO PHYSICIAN:  PLEASE COMPLETE THE ORDERS BELOW AND FAX TO   [facility] Physical Therapy: [phone]. If you are unable to process this request in 24 hours please contact our office: [phone].    ___ I have read the above report and request that my patient continue as recommended.   ___ I have read the above report and request that my patient continue therapy with the following changes/special instructions: ________________________________________________   ___ I have read the above report and request that my patient be discharged from therapy.      Physician Signature:        Date:       Time:

## 2017-05-02 NOTE — PROGRESS NOTES
PHYSICAL THERAPY - DAILY TREATMENT NOTE    Patient Name: Kash Alexandra        Date: 2017  : 1945   YES Patient  Verified  Visit #:    Insurance: Payor: Mahnaz Shake / Plan: [place] MEDICARE PART A & B / Product Type: Medicare /      In time: 9:00 Out time: 9:30   Total Treatment Time: 30     Medicare Time Tracking (below)   Total Timed Codes (min):  30 1:1 Treatment Time:  30     TREATMENT AREA =  Right shoulder pain [M25.511]    SUBJECTIVE    Pain Level (on 0 to 10 scale):  0  / 10   Medication Changes/New allergies or changes in medical history, any new surgeries or procedures? NO     If yes, update Summary List   Subjective Functional Status/Changes:  []  No changes reported     \"I think I got this. I'll be seeing you \"          OBJECTIVE    30 min Therapeutic Exercise:  [x]  See flow sheet   Rationale:      increase ROM and increase strength to improve the patients ability to perform ADL's with improved periscapular stability. min Patient Education:  YES  Reviewed HEP   []  Progressed/Changed HEP based on:   Patient reports compliance     Other Objective/Functional Measures:    See DC note. Post Treatment Pain Level (on 0 to 10) scale:   0  / 10     ASSESSMENT    Assessment/Changes in Function:     See DC note. []  See Progress Note/Recertification   Patient will continue to benefit from skilled PT services to modify and progress therapeutic interventions to attain remaining goals. Progress toward goals / Updated goals:    See DC note.      PLAN    [x]  Upgrade activities as tolerated YES Continue plan of care   []  Discharge due to :    []  Other:      Therapist: Lola Blanton    Date: 2017 Time: 11:23 AM     Future Appointments  Date Time Provider Adria Tipton   2017 9:15 AM Camilo Ely MD [address] [facility]

## 2017-05-04 ENCOUNTER — APPOINTMENT (OUTPATIENT)
Dept: PHYSICAL THERAPY | Age: 72
End: 2017-05-04
Payer: MEDICARE

## 2017-05-09 ENCOUNTER — APPOINTMENT (OUTPATIENT)
Dept: PHYSICAL THERAPY | Age: 72
End: 2017-05-09
Payer: MEDICARE

## 2017-05-12 ENCOUNTER — APPOINTMENT (OUTPATIENT)
Dept: PHYSICAL THERAPY | Age: 72
End: 2017-05-12
Payer: MEDICARE

## 2017-05-16 ENCOUNTER — APPOINTMENT (OUTPATIENT)
Dept: PHYSICAL THERAPY | Age: 72
End: 2017-05-16
Payer: MEDICARE

## 2017-05-19 ENCOUNTER — APPOINTMENT (OUTPATIENT)
Dept: PHYSICAL THERAPY | Age: 72
End: 2017-05-19
Payer: MEDICARE

## 2017-05-24 ENCOUNTER — HOSPITAL ENCOUNTER (OUTPATIENT)
Dept: PHYSICAL THERAPY | Age: 72
Discharge: HOME | End: 2017-05-24
Payer: MEDICARE

## 2017-05-24 ENCOUNTER — APPOINTMENT (OUTPATIENT)
Dept: PHYSICAL THERAPY | Age: 72
End: 2017-05-24
Payer: MEDICARE

## 2017-05-24 PROCEDURE — 97110 THERAPEUTIC EXERCISES: CPT

## 2017-05-24 PROCEDURE — G8985 CARRY GOAL STATUS: HCPCS

## 2017-05-24 PROCEDURE — 97162 PT EVAL MOD COMPLEX 30 MIN: CPT

## 2017-05-24 PROCEDURE — 97530 THERAPEUTIC ACTIVITIES: CPT

## 2017-05-24 PROCEDURE — G8984 CARRY CURRENT STATUS: HCPCS

## 2017-05-24 NOTE — PROGRESS NOTES
[address] PHYSICAL THERAPY  [address] [place] [address] Lauri Andrew, [address] - Phone: [phone]  Fax: [phone] / [address] [place] [address] PHYSICAL THERAPY SERVICES  Patient Name: Vinita Blanton :    Medical   Diagnosis: Right shoulder pain [M25.511] Treatment Diagnosis: S/p R open subscap repair   Onset Date: DOS 17     Referral Source: Katherine Berman MD [facility]): 2017   Prior Hospitalization: See medical history Provider #: [id number]   Prior Level of Function: Retired; sedentary   Comorbidities: arthritis; HTN; asthma; Hx CABG; R knee replacement; R RCR; Hx prostate resection; allergies   Medications: Verified on Patient Summary List   The Plan of Care and following information is based on the information from the initial evaluation.   ===========================================================================================  Assessment / key information:  Vinita Blanton is a 66 y.o.  male with Dx: Right shoulder pain [M25.511], s/p R open subscap repair, DOS 17. Patient previously treated at this facility prior to surgery, however reports he then got MRI which determined tear that required surgery. Objective: Patient demonstrates impaired posture, decreased R shoulder PROM (flexion 90 deg, abd 50 deg). Patient reports compliance with post-op precautions and wearing sling. FOTO score 12 points indicating 88% limitation in functional ability. Patient would benefit from skilled PT to address the below listed impairments.  Thank you for your referral.  ===========================================================================================  Eval Complexity: History: HIGH Complexity :3+ comorbidities / personal factors will impact the outcome/ POC Exam:MEDIUM Complexity : 3 Standardized tests and measures addressing body structure, function, activity limitation and / or participation in recreation Presentation: MEDIUM Complexity : Evolving with changing characteristics  Clinical Decision Making:HIGH Complexity : FOTO score of 1- 25 Overall Complexity:MEDIUM  Problem List: pain affecting function, decrease ROM, decrease strength, edema affecting function, decrease ADL/ functional abilitiies, decrease activity tolerance and decrease flexibility/ joint mobility   Treatment Plan may include any combination of the following: Therapeutic exercise, Therapeutic activities, Neuromuscular re-education, Physical agent/modality, Manual therapy, Patient education, Self Care training, Functional mobility training and Home safety training  Patient / Family readiness to learn indicated by: asking questions, trying to perform skills and interest  Persons(s) to be included in education: patient (P)  Barriers to Learning/Limitations: None  Measures taken: na   Patient Goal (s): \"get rid of this daggone sling\"   Patient self reported health status: good  Rehabilitation Potential: good   Short Term Goals: To be accomplished in  2-3  weeks:  1. Patient will demonstrate compliance with HEP for symptom management at home. 2. Patient will demonstrate R shoulder flex and abd PROM to 90 deg to decrease risk of contracture. 3. Patient will demonstrate compliance with post-op precautions to decrease risk of reinjury.  Long Term Goals: To be accomplished in  4-6  weeks:  1. Patient will be independent with HEP to self-manage and prevent symptoms upon DC. 2.  Patient will improve FOTO score to 59 points to indicate improved functional status. 3.  Patient will demonstrate R shoulder flex and abd AROM to 120 deg to prep for strength phase. 4.  Patient will demonstrate R shoulder ER AROM to 30 deg in prep for strength phase.   Frequency / Duration:   Patient to be seen  2  times per week for 6  weeks:  Patient / Caregiver education and instruction: self care, activity modification, brace/ splint application and exercises  G-Codes (GP): Carry [id number] Current  CM= 80-99%    Goal  CK= 40-59%. The severity rating is based on the FOTO Score    Therapist Signature: Tino Bryan PT Date: 3/55/1938   Certification Period: 5/24/17-8/23/17 Time: 11:08 AM   ===========================================================================================  I certify that the above Physical Therapy Services are being furnished while the patient is under my care. I agree with the treatment plan and certify that this therapy is necessary. Physician Signature:        Date:       Time:     Please sign and return to [employer] or you may fax the signed copy to [phone]. Thank you.
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
## 2017-05-26 ENCOUNTER — APPOINTMENT (OUTPATIENT)
Dept: PHYSICAL THERAPY | Age: 72
End: 2017-05-26
Payer: MEDICARE

## 2017-05-31 ENCOUNTER — APPOINTMENT (OUTPATIENT)
Dept: PHYSICAL THERAPY | Age: 72
End: 2017-05-31
Payer: MEDICARE

## 2017-06-02 ENCOUNTER — HOSPITAL ENCOUNTER (OUTPATIENT)
Dept: PHYSICAL THERAPY | Age: 72
Discharge: HOME | End: 2017-06-02
Payer: MEDICARE

## 2017-06-02 PROCEDURE — 97110 THERAPEUTIC EXERCISES: CPT

## 2017-06-02 PROCEDURE — 97140 MANUAL THERAPY 1/> REGIONS: CPT

## 2017-06-02 NOTE — PROGRESS NOTES
PHYSICAL THERAPY - DAILY TREATMENT NOTE      Patient Name: Anette Kirby        Date: 2017  : 1945   YES Patient  Verified  Visit #:     Insurance: Payor: Lamonte Romance / Plan: [place] MEDICARE PART A & B / Product Type: Medicare /      In time: 8:30 Out time: 9:12   Total Treatment Time: 42 min     Medicare Time Tracking (below)   Total Timed Codes (min):  32 1:1 Treatment Time:  23     TREATMENT AREA =  Right shoulder pain [M25.511]    SUBJECTIVE    Pain Level (on 0 to 10 scale):  0  / 10   Medication Changes/New allergies or changes in medical history, any new surgeries or procedures? NO    If yes, update Summary List   Subjective Functional Status/Changes:  []  No changes reported     \"It's not feeling too bad. I'm diligent with those exercises. \"        OBJECTIVE    Modalities Rationale:   min [] Estim, type/location:                                      []  att     []  unatt     []  w/US     []  w/ice    []  w/heat    min []  Mechanical Traction: type/lbs                   []  pro   []  sup   []  int   []  cont    []  before manual    []  after manual    min []  Ultrasound, settings/location:      min []  Iontophoresis w/ dexamethasone, location:                                               []  take home patch       []  in clinic   10 min [x]  Ice     []  Heat    location/position: Supine to R shoulder w/ BLEs on wedge    min []  Vasopneumatic Device, press/temp:     min []  Other:    [x] Skin assessment post-treatment (if applicable):    [x]  intact    []  redness- no adverse reaction     []redness - adverse reaction:      17 (8) min Therapeutic Exercise:  [x]  See flow sheet   Rationale:      increase ROM, increase strength, improve coordination and increase proprioception to improve the patients ability to perform ADLs and self care duties    15 (15) min Manual Therapy: PROM all planes, STM to periscapular musculature and R UT.    Rationale:      decrease pain, increase ROM, increase tissue extensibility and increase postural awareness to improve patient's ability to dec risk of contracture    1 min Patient Education:  YES  Reviewed HEP   []  Progressed/Changed HEP based on: Other Objective/Functional Measures:    Pt demo difficulty and required mod-max cueing to dec guarding during manual interventions. PROM advanced to approx 80 degrees with pain elicited at this point  Added supine OH shoulder flexion AAROM; improved flexion AAROM with increasing reps     Post Treatment Pain Level (on 0 to 10) scale:   2  / 10     ASSESSMENT    Assessment/Changes in Function:     Patient with fair tolerance of PROM interventions. Improved R shoulder flexion AAROM noted with inc reps of OH flexion. []  See Progress Note/Recertification   Patient will continue to benefit from skilled PT services to modify and progress therapeutic interventions, address functional mobility deficits, address ROM deficits, address strength deficits, analyze and address soft tissue restrictions, analyze and cue movement patterns, analyze and modify body mechanics/ergonomics, assess and modify postural abnormalities and instruct in home and community integration to attain remaining goals. Progress toward goals / Updated goals: · Short Term Goals: To be accomplished in 2-3 weeks:  1. Patient will demonstrate compliance with HEP for symptom management at home. 2. Patient will demonstrate R shoulder flex and abd PROM to 90 deg to decrease risk of contracture. Manual therapy, added OH flexion AAROM  3. Patient will demonstrate compliance with post-op precautions to decrease risk of reinjury.        PLAN    [x]  Upgrade activities as tolerated YES Continue plan of care   []  Discharge due to :    []  Other:      Therapist: Carolyn Cody DPT    Date: 6/2/2017 Time: 7:56 AM     Future Appointments  Date Time Provider Adria Tipton   6/2/2017 8:30 AM Del[facility]   6/5/2017 3:00 PM Reece Correa T[facility]   6/7/2017 5:00 PM Damián Zelaya [facility]   6/12/2017 9:00 AM Damián Zelaya [facility]   6/14/2017 1:30 PM Jennifer [facility]   6/19/2017 10:30 AM [facility]   [facility]2017 [facility]   6/27/2017 9:30 AM [facility]   6/29/2017 10:00 AM [facility]   5/23/2018 8:45 AM Lorraine Dominguez MD [id number] [facility]

## 2017-06-05 ENCOUNTER — HOSPITAL ENCOUNTER (OUTPATIENT)
Dept: PHYSICAL THERAPY | Age: 72
Discharge: HOME | End: 2017-06-05
Payer: MEDICARE

## 2017-06-05 PROCEDURE — 97140 MANUAL THERAPY 1/> REGIONS: CPT

## 2017-06-05 NOTE — PROGRESS NOTES
PHYSICAL THERAPY - DAILY TREATMENT NOTE    Patient Name: Tim Seymour        Date: 2017  : 1945   yes Patient  Verified  Visit #:   3   of   12  Insurance: Payor: Jarrett Bagley / Plan: [place] MEDICARE PART A & B / Product Type: Medicare /      In time: 930 Out time: 1010   Total Treatment Time: 40     Medicare Time Tracking (below)   Total Timed Codes (min):  30 1:1 Treatment Time:  10     TREATMENT AREA =  Right shoulder pain [M25.511]    SUBJECTIVE  Pain Level (on 0 to 10 scale):  2.5  / 10   Medication Changes/New allergies or changes in medical history, any new surgeries or procedures?    no  If yes, update Summary List   Subjective Functional Status/Changes:  []  No changes reported     \"I am doing this lying down one wrong.  I been doing it everyday\"          OBJECTIVE  Modalities Rationale:     decrease inflammation and decrease pain to improve patient's ability to  maintain ROM to facilitate ADLs per protocol      min [] Estim, type/location:                                      []  att     []  unatt     []  w/US     []  w/ice    []  w/heat    min []  Mechanical Traction: type/lbs                   []  pro   []  sup   []  int   []  cont    []  before manual    []  after manual    min []  Ultrasound, settings/location:      min []  Iontophoresis w/ dexamethasone, location:                                               []  take home patch       []  in clinic   10 min [x]  Ice     []  Heat    location/position: Supine with wedge under B LEs    min []  Vasopneumatic Device, press/temp:     min []  Other:    [x] Skin assessment post-treatment (if applicable):    [x]  intact    []  redness- no adverse reaction     []redness - adverse reaction:        20/0 min Therapeutic Exercise:  [x]  See flow sheet   Rationale:      increase ROM and increase strength to improve the patients ability to  maintain ROM to facilitate ADLs per protocol        10 min Manual Therapy: STM/MFR to R pecs/UT/scap border, and PROM flex/abd to 90 degs   Rationale:      decrease pain, increase ROM, increase tissue extensibility, decrease trigger points and increase postural awareness to improve patient's ability to  maintain ROM to facilitate ADLs per protocol        min Patient Education:  yes  Reviewed HEP   []  Progressed/Changed HEP based on: Pt ed on importance and benefits of compliance with HEP, core strength/stability and proper posture; pt verbalized understanding        Other Objective/Functional Measures:  pt ed on protocol; 90 degs ONLY for AAROM flex/abd; and neutral for ER  R SH flex PROM=90 degs  VCs + demo to perform proper technique for TE  demos 90 degs of AAROM with L UE assist  CCs for proper posture in sitting   Post Treatment Pain Level (on 0 to 10) scale:   0  / 10     ASSESSMENT  Assessment/Changes in Function:     c/o p! at end range of AAROM    []  See Progress Note/Recertification   Patient will continue to benefit from skilled PT services to modify and progress therapeutic interventions, address functional mobility deficits, address ROM deficits, address strength deficits, analyze and address soft tissue restrictions, analyze and cue movement patterns, analyze and modify body mechanics/ergonomics, assess and modify postural abnormalities and instruct in home and community integration to attain remaining goals. Progress toward goals / Updated goals: · Short Term Goals: To be accomplished in 2-3 weeks:  1. Patient will demonstrate compliance with HEP for symptom management at home. MET  2. Patient will demonstrate R shoulder flex and abd PROM to 90 deg to decrease risk of contracture. MET  3. Patient will demonstrate compliance with post-op precautions to decrease risk of reinjury. · Long Term Goals: To be accomplished in 4-6 weeks:  1. Patient will be independent with HEP to self-manage and prevent symptoms upon DC.   2. Patient will improve FOTO score to 59 points to indicate improved functional status. 3. Patient will demonstrate R shoulder flex and abd AROM to 120 deg to prep for strength phase.   4. Patient will demonstrate R shoulder ER AROM to 30 deg in prep for strength phase     PLAN  []  Upgrade activities as tolerated yes Continue plan of care   []  Discharge due to :    []  Other:      Therapist: Anca Luong PTA    Date: 6/5/2017 Time: 11:43 AM     Future Appointments  Date Time Provider Adria Danuta   6/7/2017 5:00 PM Anca Luong PTA [facility]   6/12/2017 9:00 AM Anca Luong PTA [facility]   6/14/2017 1:30 PM Jackyherb [facility]   6/19/2017 10:30 AM [facility]   6/21/2017 10:00 AM [facility]   6/27/2017 9:30 AM [facility]   6/29/2017 10:00 AM [facility]   5/23/2018 8:45 AM MD Maile Villalpando

## 2017-06-07 ENCOUNTER — HOSPITAL ENCOUNTER (OUTPATIENT)
Dept: PHYSICAL THERAPY | Age: 72
Discharge: HOME | End: 2017-06-07
Payer: MEDICARE

## 2017-06-07 PROCEDURE — 97530 THERAPEUTIC ACTIVITIES: CPT

## 2017-06-07 PROCEDURE — 97110 THERAPEUTIC EXERCISES: CPT

## 2017-06-07 NOTE — PROGRESS NOTES
PHYSICAL THERAPY - DAILY TREATMENT NOTE    Patient Name: Baudilio Mejia        Date: 2017  : 1945   yes Patient  Verified  Visit #:     Insurance: Payor: Kash Jorgensen / Plan: [place] MEDICARE PART A & B / Product Type: Medicare /      In time: 500 Out time: 543   Total Treatment Time: 43     Medicare Time Tracking (below)   Total Timed Codes (min):  33 1:1 Treatment Time:  23     TREATMENT AREA =  Right shoulder pain [M25.511]    SUBJECTIVE  Pain Level (on 0 to 10 scale):  1/ 10   Medication Changes/New allergies or changes in medical history, any new surgeries or procedures?    no  If yes, update Summary List   Subjective Functional Status/Changes:  []  No changes reported     \"I could not get comfortable\"          OBJECTIVE  Modalities Rationale:     decrease inflammation and decrease pain to improve patient's ability to  maintain ROM to facilitate ADLs per protocol      min [] Estim, type/location:                                      []  att     []  unatt     []  w/US     []  w/ice    []  w/heat    min []  Mechanical Traction: type/lbs                   []  pro   []  sup   []  int   []  cont    []  before manual    []  after manual    min []  Ultrasound, settings/location:      min []  Iontophoresis w/ dexamethasone, location:                                               []  take home patch       []  in clinic   10 min [x]  Ice     []  Heat    location/position: Supine with wedge under B LEs    min []  Vasopneumatic Device, press/temp:     min []  Other:    [x] Skin assessment post-treatment (if applicable):    [x]  intact    []  redness- no adverse reaction     []redness - adverse reaction:         min Therapeutic Exercise:  [x]  See flow sheet   Rationale:      increase ROM and increase strength to improve the patients ability to  maintain ROM to facilitate ADLs per protocol        10 min Manual Therapy: STM/MFR to R pecs/UT/scap border, and PROM flex/abd to 90 degs   Rationale: decrease pain, increase ROM, increase tissue extensibility, decrease trigger points and increase postural awareness to improve patient's ability to  maintain ROM to facilitate ADLs per protocol        min Patient Education:  yes  Reviewed HEP   []  Progressed/Changed HEP based on: Pt ed on importance and benefits of compliance with HEP, core strength/stability and proper posture; pt verbalized understanding        Other Objective/Functional Measures:    VCs + demo to perform proper technique for TE  increased reps without c/o p! CCs for proper posture  demos UT compensation with UT str, min improvement with VCs   Post Treatment Pain Level (on 0 to 10) scale:   0  / 10     ASSESSMENT  Assessment/Changes in Function:   Progressed there-ex without c/o increase p! []  See Progress Note/Recertification   Patient will continue to benefit from skilled PT services to modify and progress therapeutic interventions, address functional mobility deficits, address ROM deficits, address strength deficits, analyze and address soft tissue restrictions, analyze and cue movement patterns, analyze and modify body mechanics/ergonomics, assess and modify postural abnormalities and instruct in home and community integration to attain remaining goals. Progress toward goals / Updated goals: · Short Term Goals: To be accomplished in 2-3 weeks:  1. Patient will demonstrate compliance with HEP for symptom management at home. MET  2. Patient will demonstrate R shoulder flex and abd PROM to 90 deg to decrease risk of contracture. MET  3. Patient will demonstrate compliance with post-op precautions to decrease risk of reinjury. · Long Term Goals: To be accomplished in 4-6 weeks:  1. Patient will be independent with HEP to self-manage and prevent symptoms upon DC. 2. Patient will improve FOTO score to 59 points to indicate improved functional status.   3. Patient will demonstrate R shoulder flex and abd AROM to 120 deg to prep for strength phase.   4. Patient will demonstrate R shoulder ER AROM to 30 deg in prep for strength phase     PLAN  []  Upgrade activities as tolerated yes Continue plan of care   []  Discharge due to :    []  Other:      Therapist: Lakesha Carlos PTA    Date: 6/7/2017 Time: 5:37 PM     Future Appointments  Date Time Provider Adria Tipton   6/12/2017 9:00 AM Lakesha Carlos PTA [facility]   6/14/2017 1:30 PM [facility]placed by [facility]   6/19/2017 10:30 AM [facility]   6/21/2017 10:00 AM [facility]   6/27/2017 9:30 AM [facility]   6/29/2017 10:00 AM [facility]   5/23/2018 8:45 AM Manisha Weinstein MD [id number]

## 2017-06-12 ENCOUNTER — HOSPITAL ENCOUNTER (OUTPATIENT)
Dept: PHYSICAL THERAPY | Age: 72
Discharge: HOME | End: 2017-06-12
Payer: MEDICARE

## 2017-06-12 PROCEDURE — 97140 MANUAL THERAPY 1/> REGIONS: CPT

## 2017-06-12 NOTE — PROGRESS NOTES
PHYSICAL THERAPY - DAILY TREATMENT NOTE    Patient Name: Nohemy Red        Date: 2017  : 1945   yes Patient  Verified  Visit #:     Insurance: Payor: Jose Robyn / Plan: [place] MEDICARE PART A & B / Product Type: Medicare /      In time: 900 Out time: 950   Total Treatment Time: 50     Medicare Time Tracking (below)   Total Timed Codes (min):  40 1:1 Treatment Time:  10     TREATMENT AREA =  Right shoulder pain [M25.511]    SUBJECTIVE  Pain Level (on 0 to 10 scale):  0/ 10   Medication Changes/New allergies or changes in medical history, any new surgeries or procedures?    no  If yes, update Summary List   Subjective Functional Status/Changes:  []  No changes reported     \"I am not hurting today.  I tried to do that finger ladder exercise up the wall like I did before surgery and it was too difficulty\"          OBJECTIVE  Modalities Rationale:     decrease inflammation and decrease pain to improve patient's ability to  maintain ROM to facilitate ADLs per protocol      min [] Estim, type/location:                                      []  att     []  unatt     []  w/US     []  w/ice    []  w/heat    min []  Mechanical Traction: type/lbs                   []  pro   []  sup   []  int   []  cont    []  before manual    []  after manual    min []  Ultrasound, settings/location:      min []  Iontophoresis w/ dexamethasone, location:                                               []  take home patch       []  in clinic   10 min [x]  Ice     []  Heat    location/position: Supine with wedge under B LEs    min []  Vasopneumatic Device, press/temp:     min []  Other:    [x] Skin assessment post-treatment (if applicable):    [x]  intact    []  redness- no adverse reaction     []redness - adverse reaction:        30/0 min Therapeutic Exercise:  [x]  See flow sheet   Rationale:      increase ROM and increase strength to improve the patients ability to  maintain ROM to facilitate ADLs per protocol 10 min Manual Therapy: STM/MFR to R pecs/UT/scap border, and PROM flex/abd to 90 degs   Rationale:      decrease pain, increase ROM, increase tissue extensibility, decrease trigger points and increase postural awareness to improve patient's ability to  maintain ROM to facilitate ADLs per protocol        min Patient Education:  yes  Reviewed HEP   []  Progressed/Changed HEP based on: Pt ed on importance and benefits of compliance with HEP, core strength/stability and proper posture; pt verbalized understanding        Other Objective/Functional Measures:  instructed pt on protocol and to follow precautions no AAROM/AROM; pt verbalized understanding    VCs + demo to perform proper technique for TE  increased reps without c/o p!  demos SH flex with dowel to ~85 degs   Post Treatment Pain Level (on 0 to 10) scale:   0  / 10     ASSESSMENT  Assessment/Changes in Function:   Progressed there-ex without c/o increase p! []  See Progress Note/Recertification   Patient will continue to benefit from skilled PT services to modify and progress therapeutic interventions, address functional mobility deficits, address ROM deficits, address strength deficits, analyze and address soft tissue restrictions, analyze and cue movement patterns, analyze and modify body mechanics/ergonomics, assess and modify postural abnormalities and instruct in home and community integration to attain remaining goals. Progress toward goals / Updated goals: · Short Term Goals: To be accomplished in 2-3 weeks:  1. Patient will demonstrate compliance with HEP for symptom management at home. MET  2. Patient will demonstrate R shoulder flex and abd PROM to 90 deg to decrease risk of contracture. MET  3. Patient will demonstrate compliance with post-op precautions to decrease risk of reinjury. not met, pt attempted to perform AAROM TE without instructions  · Long Term Goals: To be accomplished in 4-6 weeks:  1.  Patient will be independent with HEP to self-manage and prevent symptoms upon DC. 2. Patient will improve FOTO score to 59 points to indicate improved functional status. 3. Patient will demonstrate R shoulder flex and abd AROM to 120 deg to prep for strength phase.   4. Patient will demonstrate R shoulder ER AROM to 30 deg in prep for strength phase     PLAN  []  Upgrade activities as tolerated yes Continue plan of care   []  Discharge due to :    []  Other:      Therapist: Fernanda Gramajo PTA    Date: 6/12/2017 Time: 1:55 PM     Future Appointments  Date Time Provider Adria Tipton   6/14/2017 1:30 PM [address]   6/19/2017 10:30 AM [address]   6/21/2017 10:00 AM [address]   6/27/2017 9:30 AM [address]   6/29/2017 10:00 AM [address]   5/23/2018 8:45 AM Amanda Rogers MD [facility] [facility]

## 2017-06-14 ENCOUNTER — HOSPITAL ENCOUNTER (OUTPATIENT)
Dept: PHYSICAL THERAPY | Age: 72
Discharge: HOME | End: 2017-06-14
Payer: MEDICARE

## 2017-06-14 PROCEDURE — 97110 THERAPEUTIC EXERCISES: CPT

## 2017-06-14 NOTE — PROGRESS NOTES
PHYSICAL THERAPY - DAILY TREATMENT NOTE    Patient Name: Vanessa Thurston        Date: 2017  : 1945   yes Patient  Verified  Visit #:     Insurance: Payor: Mague[employer] Car / Plan: [place] MEDICARE PART A & B / Product Type: Medicare /      In time: 131 Out time: 211   Total Treatment Time: 40     Medicare Time Tracking (below)   Total Timed Codes (min):  30 1:1 Treatment Time:  15     TREATMENT AREA =  Right shoulder pain [M25.511]    SUBJECTIVE  Pain Level (on 0 to 10 scale):  0/ 10   Medication Changes/New allergies or changes in medical history, any new surgeries or procedures?    no  If yes, update Summary List   Subjective Functional Status/Changes:  []  No changes reported     \"I had to put ice on it last night at 2 am         OBJECTIVE  Modalities Rationale:     decrease inflammation and decrease pain to improve patient's ability to  maintain ROM to facilitate ADLs per protocol      min [] Estim, type/location:                                      []  att     []  unatt     []  w/US     []  w/ice    []  w/heat    min []  Mechanical Traction: type/lbs                   []  pro   []  sup   []  int   []  cont    []  before manual    []  after manual    min []  Ultrasound, settings/location:      min []  Iontophoresis w/ dexamethasone, location:                                               []  take home patch       []  in clinic   10 min [x]  Ice     []  Heat    location/position: Supine with wedge under B LEs    min []  Vasopneumatic Device, press/temp:     min []  Other:    [x] Skin assessment post-treatment (if applicable):    [x]  intact    []  redness- no adverse reaction     []redness - adverse reaction:        22/15 min Therapeutic Exercise:  [x]  See flow sheet   Rationale:      increase ROM and increase strength to improve the patients ability to  maintain ROM to facilitate ADLs per protocol        8/0 min Manual Therapy: STM/MFR to R pecs/UT/scap border, and PROM flex/abd to 90 degs Rationale:      decrease pain, increase ROM, increase tissue extensibility, decrease trigger points and increase postural awareness to improve patient's ability to  maintain ROM to facilitate ADLs per protocol        min Patient Education:  yes  Reviewed HEP   []  Progressed/Changed HEP based on: Pt ed on importance and benefits of compliance with HEP, core strength/stability and proper posture; pt verbalized understanding        Other Objective/Functional Measures:    VCs + demo to perform proper technique for TE  increased reps without c/o p!  demos SH flex with dowel to ~85 degs   Post Treatment Pain Level (on 0 to 10) scale:   0  / 10     ASSESSMENT  Assessment/Changes in Function:   Progressed there-ex without c/o increase p! []  See Progress Note/Recertification   Patient will continue to benefit from skilled PT services to modify and progress therapeutic interventions, address functional mobility deficits, address ROM deficits, address strength deficits, analyze and address soft tissue restrictions, analyze and cue movement patterns, analyze and modify body mechanics/ergonomics, assess and modify postural abnormalities and instruct in home and community integration to attain remaining goals. Progress toward goals / Updated goals: · Short Term Goals: To be accomplished in 2-3 weeks:  1. Patient will demonstrate compliance with HEP for symptom management at home. MET  2. Patient will demonstrate R shoulder flex and abd PROM to 90 deg to decrease risk of contracture. MET  3. Patient will demonstrate compliance with post-op precautions to decrease risk of reinjury. not met, pt attempted to perform AAROM TE without instructions  · Long Term Goals: To be accomplished in 4-6 weeks:  1. Patient will be independent with HEP to self-manage and prevent symptoms upon DC. 2. Patient will improve FOTO score to 59 points to indicate improved functional status.   3. Patient will demonstrate R shoulder flex and abd AROM to 120 deg to prep for strength phase.   4. Patient will demonstrate R shoulder ER AROM to 30 deg in prep for strength phase     PLAN  []  Upgrade activities as tolerated yes Continue plan of care   []  Discharge due to :    []  Other:      Therapist: Lakesha Carlos PTA    Date: 6/14/2017 Time: 1:55 PM     Future Appointments  Date Time Provider Adria Tipton   6/19/2017 10:30 AM [address] [place] [address]   6/21/2017 10:00 AM [facility]   6/27/2017 9:30 AM [facility]   6/29/2017 10:00 AM [facility]   5/23/2018 8:45 AM Manisha Weinstein MD [address]

## 2017-06-19 ENCOUNTER — HOSPITAL ENCOUNTER (OUTPATIENT)
Dept: PHYSICAL THERAPY | Age: 72
Discharge: HOME | End: 2017-06-19
Payer: MEDICARE

## 2017-06-19 PROCEDURE — 97110 THERAPEUTIC EXERCISES: CPT

## 2017-06-19 PROCEDURE — 97140 MANUAL THERAPY 1/> REGIONS: CPT

## 2017-06-19 NOTE — PROGRESS NOTES
PHYSICAL THERAPY - DAILY TREATMENT NOTE    Patient Name: Jessica Zambrano        Date: 2017  : 1945   YES Patient  Verified  Visit #:     Insurance: Payor: Naomie Haro / Plan: [place] MEDICARE PART A & B / Product Type: Medicare /      In time: 10:30 Out time: 11:10   Total Treatment Time: 40     Medicare Time Tracking (below)   Total Timed Codes (min):  30 1:1 Treatment Time:  30     TREATMENT AREA =  Right shoulder pain [M25.511]    SUBJECTIVE    Pain Level (on 0 to 10 scale):  0  / 10   Medication Changes/New allergies or changes in medical history, any new surgeries or procedures? NO     If yes, update Summary List   Subjective Functional Status/Changes:  []  No changes reported     \"I have been feeling pretty good. It gets stiff right after everyone works on it, but I just ice it and it doesn't bother me. \"          OBJECTIVE    Modalities Rationale: To decrease pain and inflammation. min [] Estim, type/location:                                      []  att     []  unatt     []  w/US     []  w/ice    []  w/heat    min []  Mechanical Traction: type/lbs                   []  pro   []  sup   []  int   []  cont    []  before manual    []  after manual    min []  Ultrasound, settings/location:      min []  Iontophoresis w/ dexamethasone, location:                                               []  take home patch       []  in clinic   10 min []  Ice     []  Heat    location/position:     min []  Vasopneumatic Device, press/temp:     min []  Other:    [x] Skin assessment post-treatment (if applicable):   []  intact    []  redness- no adverse reaction     []redness - adverse reaction:     ()715 min Therapeutic Exercise:  [x]  See flow sheet   Rationale:      increase ROM and increase strength to improve the patients ability to perform ADL's with improved OH mobility and periscapular stability.      15 min Manual Therapy: PROM flexion and abduction, CR to the periscapular musculature Rationale:      decrease pain, increase ROM and increase tissue extensibility to improve patient's ability to perform ADL's with improved OH mobility. min Patient Education:  YES  Reviewed HEP   []  Progressed/Changed HEP based on:   Patient reports compliance     Other Objective/Functional Measures:    Pt could demonstrate 90 deg of flexion and 82 deg of scaption PROM with CR methods to the [facility] to the periscapular region. No compliants with other exercises. Post Treatment Pain Level (on 0 to 10) scale:   2  / 10     ASSESSMENT    Assessment/Changes in Function:     Pt presents to PT slowly progressing in PROM. Will reassess NV and progress to more AAROM after 6/22/17 (6 weeks post-op). []  See Progress Note/Recertification   Patient will continue to benefit from skilled PT services to modify and progress therapeutic interventions, address functional mobility deficits, address ROM deficits, address strength deficits, analyze and address soft tissue restrictions, analyze and cue movement patterns, analyze and modify body mechanics/ergonomics and assess and modify postural abnormalities to attain remaining goals. Progress toward goals / Updated goals: Will reassess NV.      PLAN    [x]  Upgrade activities as tolerated YES Continue plan of care   []  Discharge due to :    []  Other:      Therapist: Josue Penaloza    Date: 6/19/2017 Time: 10:36 AM     Future Appointments  Date Time Provider Adria Tipton   6/21/2017 10:00 AM [facility]   6/27/2017 9:30 AM [facility]   6/29/2017 10:00 AM Josue Penaloza [facility]   5/23/2018 8:45 AM Jamie Kidd MD [address]

## 2017-06-21 ENCOUNTER — HOSPITAL ENCOUNTER (OUTPATIENT)
Dept: PHYSICAL THERAPY | Age: 72
Discharge: HOME | End: 2017-06-21
Payer: MEDICARE

## 2017-06-21 PROCEDURE — 97530 THERAPEUTIC ACTIVITIES: CPT

## 2017-06-21 PROCEDURE — 97110 THERAPEUTIC EXERCISES: CPT

## 2017-06-21 PROCEDURE — G8985 CARRY GOAL STATUS: HCPCS

## 2017-06-21 PROCEDURE — 97140 MANUAL THERAPY 1/> REGIONS: CPT

## 2017-06-21 PROCEDURE — G8984 CARRY CURRENT STATUS: HCPCS

## 2017-06-21 NOTE — PROGRESS NOTES
[facility] PHYSICAL THERAPY  [address] [place] [address] [place], [address] [employer] [address] - Phone: [phone]  Fax: [phone]  PROGRESS NOTE  Patient Name: Jazmine Noel :    Treatment/Medical Diagnosis: Right shoulder pain [M25.511]   Referral Source: Laura Butler MD     Date of Initial Visit: 17 Attended Visits: 8 Missed Visits: 0     SUMMARY OF TREATMENT  Patient's POC has consisted of pendulums, gentle scapulothoracic strengthening, cervical stretches/AROM, elbow/wrist AROM, manual therapy including PROM in all planes and soft tissue mobilization, icing for pain reduction. Key Functional Changes/Progress  Patient has minimal pain at this time and has been compliant with use of RUE sling. Pt's PROM has progressed since IE, however firm end feel noted approx 90 degrees of flexion/abduction. This improves with PROM and anticipate further improvement beginning next week with addition of AAROM interventions when cleared. Shoulder ROM                                                     PROM      Right   Flexion Flexion 95   Extension Extension  NT   Scaption/ABD Scaption/ABD 90   ER @ 0 Degrees ER @ 45 Degrees  45   IR HBB (cm from C7) IR @ 45 degrees  NT   ER @ 90 Degrees ER @ 90 Degrees  43   IR @ 90 Degrees IR @ 90 Degrees  30       Goal/Measure of Progress Goal Met? · Short Term Goals: To be accomplished in  2-3  weeks:  1. Patient will demonstrate compliance with HEP for symptom management at home. 2. Patient will demonstrate R shoulder flex and abd PROM to 90 deg to decrease risk of contracture. 3. Patient will demonstrate compliance with post-op precautions to decrease risk of reinjury. 1. MET    2. MET    3. MET     New Goals to be achieved in __4__  weeks:  1. Patient will demonstrate compliance with HEP for symptom management at home. 2. Patient will improve R shoulder flexion PROM to 145 degrees in order to progress TE  3.  Patient will improve R shoulder flexion AROM to 90 degrees with minimal UT compensation in order to improve ease with ADLs  4. Patient will improve R shoulder abduction PROM to 120 degrees in order to improve ease with overhead duties  Frequency / Duration:   Patient to be seen  2  times per week for 4  weeks:    G-Codes: Carry  Current  CL= 60-79%    Goal  CK= 40-59%. The severity rating is based on the FOTO Score     RECOMMENDATIONS  Continue and progress functional therex/therapeutic activity as able, utilizing manual therapy and modalities prn. Progress patient towards independent HEP to facilitate self-management of symptoms and progress gains after PT. If you have any questions/comments please contact us directly at [phone]. Thank you for allowing us to assist in the care of your patient. Therapist Signature: Dennis Tamayo DPT Date: 0/99/3738   Certification Period: 5/24/17 - 8/23/17     Reporting Period 5/24/17 - 6/21/17   Time: 8:55 AM   NOTE TO PHYSICIAN:  PLEASE COMPLETE THE ORDERS BELOW AND FAX TO   [facility]: [phone]. If you are unable to process this request in 24 hours please contact our office: [phone].    ___ I have read the above report and request that my patient continue as recommended.   ___ I have read the above report and request that my patient continue therapy with the following changes/special instructions:_________________________________________________________   ___ I have read the above report and request that my patient be discharged from therapy.      Physician Signature:        Date:       Time:

## 2017-06-21 NOTE — PROGRESS NOTES
PHYSICAL THERAPY - DAILY TREATMENT NOTE      Patient Name: Venita Adkins        Date: 2017  : 1945   YES Patient  Verified  Visit #:     Insurance: Payor: Jesus Naqvi / Plan: [place] MEDICARE PART A & B / Product Type: Medicare /      In time: 10:00 Out time: 10:49   Total Treatment Time: 49 min     Medicare Time Tracking (below)   Total Timed Codes (min):  39 1:1 Treatment Time:  39     TREATMENT AREA =  Right shoulder pain [M25.511]    SUBJECTIVE    Pain Level (on 0 to 10 scale):  0  / 10   Medication Changes/New allergies or changes in medical history, any new surgeries or procedures? NO    If yes, update Summary List   Subjective Functional Status/Changes:  []  No changes reported     \"It's a little sore today. \"        OBJECTIVE    Modalities Rationale:        min [] Estim, type/location:                                      []  att     []  unatt     []  w/US     []  w/ice    []  w/heat    min []  Mechanical Traction: type/lbs                   []  pro   []  sup   []  int   []  cont    []  before manual    []  after manual    min []  Ultrasound, settings/location:      min []  Iontophoresis w/ dexamethasone, location:                                               []  take home patch       []  in clinic   10 min [x]  Ice     []  Heat    location/position: R shoulder supine BLE on wedge    min []  Vasopneumatic Device, press/temp:     min []  Other:    [x] Skin assessment post-treatment (if applicable):    [x]  intact    []  redness- no adverse reaction     []redness - adverse reaction:      14 (14) min Therapeutic Exercise:  [x]  See flow sheet   Rationale:      increase ROM, increase strength, improve coordination and increase proprioception to improve the patients ability to maintain elbow/wrist ROM and scapular strength     10 (10) min Therapeutic Activity: FOTO, Re-assessment   Rationale:  To assess current functional limitations and review POC    15 (15) min Manual Therapy: PROM all planes, Grade 2 GH post/inf joint mobs   Rationale:      decrease pain, increase ROM, increase tissue extensibility and increase postural awareness to improve patient's ability to improve ability to perform ADLs when cleared for AROM    1 min Patient Education:  YES  Reviewed HEP   []  Progressed/Changed HEP based on: Other Objective/Functional Measures:    See PN     Post Treatment Pain Level (on 0 to 10) scale:   0  / 10     ASSESSMENT    Assessment/Changes in Function:     See PN     []  See Progress Note/Recertification   Patient will continue to benefit from skilled PT services to modify and progress therapeutic interventions, address functional mobility deficits, address ROM deficits, address strength deficits, analyze and address soft tissue restrictions, analyze and cue movement patterns, analyze and modify body mechanics/ergonomics, assess and modify postural abnormalities and instruct in home and community integration to attain remaining goals.      Progress toward goals / Updated goals:    See PN     PLAN    [x]  Upgrade activities as tolerated YES Continue plan of care   []  Discharge due to :    []  Other:      Therapist: Teodoro Bartlett DPT    Date: 6/21/2017 Time: 8:54 AM     Future Appointments  Date Time Provider Adria Tipton   6/21/2017 10:00 AM [facility]   6/27/2017 9:30 AM [facility]   6/29/2017 10:00 AM Dominik [facility]   5/23/2018 8:45 AM Jah Condon MD [address]

## 2017-06-27 ENCOUNTER — HOSPITAL ENCOUNTER (OUTPATIENT)
Dept: PHYSICAL THERAPY | Age: 72
Discharge: HOME | End: 2017-06-27
Payer: MEDICARE

## 2017-06-27 PROCEDURE — 97110 THERAPEUTIC EXERCISES: CPT

## 2017-06-27 NOTE — PROGRESS NOTES
PHYSICAL THERAPY - DAILY TREATMENT NOTE      Patient Name: Viki Guardidalila        Date: 2017  : 1945   YES Patient  Verified  Visit #:     Insurance: Payor: Lilia Cueva / Plan: [place] MEDICARE PART A & B / Product Type: Medicare /      In time: 9:30 Out time: 10:20   Total Treatment Time: 50     Medicare Time Tracking (below)   Total Timed Codes (min):  40 1:1 Treatment Time:  40     TREATMENT AREA =  Right shoulder pain [M25.511]    SUBJECTIVE    Pain Level (on 0 to 10 scale):  0  / 10   Medication Changes/New allergies or changes in medical history, any new surgeries or procedures? NO    If yes, update Summary List   Subjective Functional Status/Changes:  []  No changes reported     \"I'm out of the sling! \"   Pt reports he will be out of town next week. OBJECTIVE    Modalities Rationale:        min [] Estim, type/location:                                      []  att     []  unatt     []  w/US     []  w/ice    []  w/heat    min []  Mechanical Traction: type/lbs                   []  pro   []  sup   []  int   []  cont    []  before manual    []  after manual    min []  Ultrasound, settings/location:      min []  Iontophoresis w/ dexamethasone, location:                                               []  take home patch       []  in clinic   10 min [x]  Ice     []  Heat    location/position: R shoulder supine BLE on wedge    min []  Vasopneumatic Device, press/temp:     min []  Other:    [x] Skin assessment post-treatment (if applicable):    [x]  intact    []  redness- no adverse reaction     []redness - adverse reaction:      40 (40) min Therapeutic Exercise:  [x]  See flow sheet   Rationale:      increase ROM, increase strength, improve coordination and increase proprioception to improve the patients ability to perform ADLs and overhead activities with increased ease      1 min Patient Education:  YES  Reviewed HEP   []  Progressed/Changed HEP based on:         Other Objective/Functional Measures: Added new AAROM therex per flowsheet  Pt demo improving flexion PROM although requires several repetitions to warm-up  Tolerated AAROM well today with slight inc discomfort  Significant difficulty with wall wash and only able to perform to approximately 90 degrees flexion. Post Treatment Pain Level (on 0 to 10) scale:   0  / 10     ASSESSMENT    Assessment/Changes in Function:     Pt will benefit from review of newly added AAROM inter     []  See Progress Note/Recertification   Patient will continue to benefit from skilled PT services to modify and progress therapeutic interventions, address functional mobility deficits, address ROM deficits, address strength deficits, analyze and address soft tissue restrictions, analyze and cue movement patterns, analyze and modify body mechanics/ergonomics, assess and modify postural abnormalities and instruct in home and community integration to attain remaining goals. Progress toward goals / Updated goals:    New Goals to be achieved in __4__  weeks:  1. Patient will demonstrate compliance with HEP for symptom management at home. 2. Patient will improve R shoulder flexion PROM to 145 degrees in order to progress TE  3. Patient will improve R shoulder flexion AROM to 90 degrees with minimal UT compensation in order to improve ease with ADLs. Added AAROM interventions today  4.  Patient will improve R shoulder abduction PROM to 120 degrees in order to improve ease with overhead duties       PLAN    [x]  Upgrade activities as tolerated YES Continue plan of care   []  Discharge due to :    []  Other:      Therapist: Anisha Daley DPT    Date: 6/27/2017 Time: 8:34 AM     Future Appointments  Date Time Provider Adria Tipton   6/27/2017 9:30 AM [facility]   6/29/2017 10:00 AM Alec [facility]   7/3/2017 8:30 AM Eliel [facility]   7/6/2017 9:00 AM Rebecca Thurman [address] [place] [address]   7/11/2017 9:30 AM [facility]   7/14/2017 8:30 AM Kyle PINA Jaclyn [facility]   7/18/2017 9:30 AM Kyle Anaya [facility]   7/20/2017 9:00 AM Oli Mcgraw [facility]   7/25/2017 9:30 AM [facility]   7/28/2017 9:00 AM [facility]   5/23/2018 8:45 AM Jade Muñiz MD [facility] [facility]

## 2017-06-29 ENCOUNTER — HOSPITAL ENCOUNTER (OUTPATIENT)
Dept: PHYSICAL THERAPY | Age: 72
Discharge: HOME | End: 2017-06-29
Payer: MEDICARE

## 2017-06-29 PROCEDURE — 97140 MANUAL THERAPY 1/> REGIONS: CPT

## 2017-06-29 PROCEDURE — 97110 THERAPEUTIC EXERCISES: CPT

## 2017-06-29 NOTE — PROGRESS NOTES
PHYSICAL THERAPY - DAILY TREATMENT NOTE    Patient Name: Clarissa Trujillo        Date: 2017  : 1945   YES Patient  Verified  Visit #:   10   of   12  Insurance: Payor: Matt Stoddard / Plan: VA MEDICARE PART A & B / Product Type: Medicare /      In time: 9:45 Out time: 10:50   Total Treatment Time: 65     Medicare Time Tracking (below)   Total Timed Codes (min):  55 1:1 Treatment Time:  55     TREATMENT AREA =  Right shoulder pain [M25.511]    SUBJECTIVE    Pain Level (on 0 to 10 scale):  0  / 10   Medication Changes/New allergies or changes in medical history, any new surgeries or procedures? NO     If yes, update Summary List   Subjective Functional Status/Changes:  []  No changes reported     \"I started doing some more exercises at home on my side. Are these okay. \"          OBJECTIVE    Modalities Rationale:  palliative      min [] Estim, type/location:                                      []  att     []  unatt     []  w/US     []  w/ice    []  w/heat    min []  Mechanical Traction: type/lbs                   []  pro   []  sup   []  int   []  cont    []  before manual    []  after manual    min []  Ultrasound, settings/location:      min []  Iontophoresis w/ dexamethasone, location:                                               []  take home patch       []  in clinic   10 min [x]  Ice     []  Heat    location/position: Supine with LE's elevated    min []  Vasopneumatic Device, press/temp:     min []  Other:    [x] Skin assessment post-treatment (if applicable):   []  intact    []  redness- no adverse reaction     []redness - adverse reaction:      45 min Therapeutic Exercise:  [x]  See flow sheet   Rationale:      increase ROM and increase strength to improve the patients ability to perform [facility] ADL's with improved periscapular stability.      10 min Manual Therapy: PROM all directions with CR and gentle grade 3 inferior and posterior mob's   Rationale:      decrease pain, increase ROM and increase tissue extensibility to improve patient's ability to perform [place] ADL's with improved flexibility. min Patient Education:  YES  Reviewed HEP   []  Progressed/Changed HEP based on:   Patient reports compliance     Other Objective/Functional Measures:    Pt demonstrates poor scapulohumeral rythym with AAROM standing wand flexion, depsite performance of shoulder press to fire periscapular stabilizers. However, pt could perform in SL with appropriate mechanics, increased AROM and no pain. Pt is more limited in abduction AROM, but is able to perform AAROM with the wand without complications or compensations. Post Treatment Pain Level (on 0 to 10) scale:     / 10     ASSESSMENT    Assessment/Changes in Function:     Pt presents to PT progressing appropriately in AROM. []  See Progress Note/Recertification   Patient will continue to benefit from skilled PT services to modify and progress therapeutic interventions, address functional mobility deficits, address ROM deficits, address strength deficits, analyze and address soft tissue restrictions, analyze and cue movement patterns, analyze and modify body mechanics/ergonomics and assess and modify postural abnormalities to attain remaining goals. Progress toward goals / Updated goals:     Addressed flexibility goals with PROM and sidelying AROM;.     PLAN    [x]  Upgrade activities as tolerated YES Continue plan of care   []  Discharge due to :    []  Other:      Therapist: Alec Duckworth    Date: 6/29/2017 Time: 2:26 PM     Future Appointments  Date Time Provider Adria Tipton   7/5/2017 11:00 AM Teresa Maurice [facility]   7/6/2017 8:00 AM Teresa Maurice [facility]   7/18/2017 9:30 AM Eliel [facility]   7/20/2017 9:00 AM Alec Duckworth [facility]   7/25/2017 9:30 AM [facility]   7/28/2017 9:00 AM [facility]   5/23/2018 8:45 AM Marilu Kaufman MD [address] [facility]

## 2017-07-03 ENCOUNTER — APPOINTMENT (OUTPATIENT)
Dept: PHYSICAL THERAPY | Age: 72
End: 2017-07-03
Payer: MEDICARE

## 2017-07-05 ENCOUNTER — HOSPITAL ENCOUNTER (OUTPATIENT)
Dept: PHYSICAL THERAPY | Age: 72
Discharge: HOME | End: 2017-07-05
Payer: MEDICARE

## 2017-07-05 PROCEDURE — 97110 THERAPEUTIC EXERCISES: CPT

## 2017-07-05 NOTE — PROGRESS NOTES
PHYSICAL THERAPY - DAILY TREATMENT NOTE    Patient Name: Jannie Rust        Date: 2017  : 1945   yes Patient  Verified  Visit #:     Insurance: Payor: Budd Pore / Plan: [place] MED[place] & B / Product Type: Medicare /      In time: 1050 Out time: 1140   Total Treatment Time: 50     Medicare Time Tracking (below)   Total Timed Codes (min):  40 1:1 Treatment Time:  15     TREATMENT AREA =  Right shoulder pain [M25.511]    SUBJECTIVE  Pain Level (on 0 to 10 scale):  0/ 10   Medication Changes/New allergies or changes in medical history, any new surgeries or procedures?    no  If yes, update Summary List   Subjective Functional Status/Changes:  []  No changes reported     \"I am sleeping better bc I am out of that sling\"       OBJECTIVE  Modalities Rationale:     decrease inflammation and decrease pain to improve patient's ability to  maintain ROM to facilitate ADLs per protocol      min [] Estim, type/location:                                      []  att     []  unatt     []  w/US     []  w/ice    []  w/heat    min []  Mechanical Traction: type/lbs                   []  pro   []  sup   []  int   []  cont    []  before manual    []  after manual    min []  Ultrasound, settings/location:      min []  Iontophoresis w/ dexamethasone, location:                                               []  take home patch       []  in clinic   10 min [x]  Ice     []  Heat    location/position: Supine with wedge under B LEs    min []  Vasopneumatic Device, press/temp:     min []  Other:    [x] Skin assessment post-treatment (if applicable):    [x]  intact    []  redness- no adverse reaction     []redness - adverse reaction:        30/15 min Therapeutic Exercise:  [x]  See flow sheet   Rationale:      increase ROM and increase strength to improve the patients ability to  maintain ROM to facilitate ADLs per protocol        10/0 min Manual Therapy: STM/MFR to R pecs/UT/scap border, and PROM flex; and ER  IR/ER(*with 0 degs of abd); and flex/ext with elbow at quick reversals    Rationale:      decrease pain, increase ROM, increase tissue extensibility, decrease trigger points and increase postural awareness to improve patient's ability to  maintain ROM to facilitate ADLs per protocol        min Patient Education:  yes  Reviewed HEP   []  Progressed/Changed HEP based on: Pt ed on importance and benefits of compliance with HEP, core strength/stability and proper posture; pt verbalized understanding        Other Objective/Functional Measures:    VCs + demo to perform proper technique for TE   R SH Flex=150 degs PROM  initiated wall wash, scap on finger ladder, and SL abd/flex without c/o p!  c/o discomfort with Post cap str   Post Treatment Pain Level (on 0 to 10) scale:   0  / 10     ASSESSMENT  Assessment/Changes in Function:   Progressed there-ex without c/o increase p! []  See Progress Note/Recertification   Patient will continue to benefit from skilled PT services to modify and progress therapeutic interventions, address functional mobility deficits, address ROM deficits, address strength deficits, analyze and address soft tissue restrictions, analyze and cue movement patterns, analyze and modify body mechanics/ergonomics, assess and modify postural abnormalities and instruct in home and community integration to attain remaining goals. Progress toward goals / Updated goals:    New Goals to be achieved in __4__  weeks:  1. Patient will demonstrate compliance with HEP for symptom management at home. 2. Patient will improve R shoulder flexion PROM to 145 degrees in order to progress TE. MET; R SH Flex=150 degs PROM  3. Patient will improve R shoulder flexion AROM to 90 degrees with minimal UT compensation in order to improve ease with ADLs  4.  Patient will improve R shoulder abduction PROM to 120 degrees in order to improve ease with overhead duties     PLAN  []  Upgrade activities as tolerated yes Continue plan of care   []  Discharge due to :    []  Other:      Therapist: Lulu Hancock PTA    Date: 7/5/2017 Time: 2:37 PM     Future Appointments  Date Time Provider Adria Tipton   7/6/2017 8:00 AM Lulu Hancock PTA [facility]   7/18/2017 9:30 AM [facility]   [facility]   7/25/2017 9:30 AM [facility]   7/28/2017 9:00 AM [facility]   5/23/2018 8:45 AM Yvan Etienne MD [address]

## 2017-07-06 ENCOUNTER — APPOINTMENT (OUTPATIENT)
Dept: PHYSICAL THERAPY | Age: 72
End: 2017-07-06
Payer: MEDICARE

## 2017-07-06 ENCOUNTER — HOSPITAL ENCOUNTER (OUTPATIENT)
Dept: PHYSICAL THERAPY | Age: 72
Discharge: HOME | End: 2017-07-06
Payer: MEDICARE

## 2017-07-06 PROCEDURE — 97110 THERAPEUTIC EXERCISES: CPT

## 2017-07-06 NOTE — PROGRESS NOTES
PHYSICAL THERAPY - DAILY TREATMENT NOTE    Patient Name: Viki Quintanaan        Date: 2017  : 1945   yes Patient  Verified  Visit #:     Insurance: Payor: Lilia Cueva / Plan: [place] MEDICARE PART A & B / Product Type: Medicare /      In time: 800 Out time: 856   Total Treatment Time: 64     Medicare Time Tracking (below)   Total Timed Codes (min):  46 1:1 Treatment Time:  15     TREATMENT AREA =  Right shoulder pain [M25.511]    SUBJECTIVE  Pain Level (on 0 to 10 scale):  3-4/ 10   Medication Changes/New allergies or changes in medical history, any new surgeries or procedures?    no  If yes, update Summary List   Subjective Functional Status/Changes:  []  No changes reported     \"I had to put ice on it 4 am this morning\"       OBJECTIVE  Modalities Rationale:     decrease inflammation and decrease pain to improve patient's ability to  maintain ROM to facilitate ADLs per protocol      min [] Estim, type/location:                                      []  att     []  unatt     []  w/US     []  w/ice    []  w/heat    min []  Mechanical Traction: type/lbs                   []  pro   []  sup   []  int   []  cont    []  before manual    []  after manual    min []  Ultrasound, settings/location:      min []  Iontophoresis w/ dexamethasone, location:                                               []  take home patch       []  in clinic   10 min [x]  Ice     []  Heat    location/position: Supine with wedge under B LEs    min []  Vasopneumatic Device, press/temp:     min []  Other:    [x] Skin assessment post-treatment (if applicable):    [x]  intact    []  redness- no adverse reaction     []redness - adverse reaction:        36/15 min Therapeutic Exercise:  [x]  See flow sheet   Rationale:      increase ROM and increase strength to improve the patients ability to  maintain ROM to facilitate ADLs per protocol        10/0 min Manual Therapy: STM/MFR to R pecs/UT/scap border, and PROM flex, scap, and ER Rationale:      decrease pain, increase ROM, increase tissue extensibility, decrease trigger points and increase postural awareness to improve patient's ability to  maintain ROM to facilitate ADLs per protocol        min Patient Education:  yes  Reviewed HEP   []  Progressed/Changed HEP based on: Pt ed on importance and benefits of compliance with HEP, core strength/stability and proper posture; pt verbalized understanding        Other Objective/Functional Measures:    VCs + demo to perform proper technique for TE  initiated supine body blade sh flex @ 90 degs, and IR/ER without c/o p!  demos UT compensation with SH flex AAROM with dowel, min improvement with VCs   Post Treatment Pain Level (on 0 to 10) scale:   0  / 10     ASSESSMENT  Assessment/Changes in Function:   Progressed there-ex without c/o increase p! []  See Progress Note/Recertification   Patient will continue to benefit from skilled PT services to modify and progress therapeutic interventions, address functional mobility deficits, address ROM deficits, address strength deficits, analyze and address soft tissue restrictions, analyze and cue movement patterns, analyze and modify body mechanics/ergonomics, assess and modify postural abnormalities and instruct in home and community integration to attain remaining goals. Progress toward goals / Updated goals:    New Goals to be achieved in __4__  weeks:  1. Patient will demonstrate compliance with HEP for symptom management at home. 2. Patient will improve R shoulder flexion PROM to 145 degrees in order to progress TE. MET; R SH Flex=150 degs PROM  3. Patient will improve R shoulder flexion AROM to 90 degrees with minimal UT compensation in order to improve ease with ADLs  4.  Patient will improve R shoulder abduction PROM to 120 degrees in order to improve ease with overhead duties     PLAN  []  Upgrade activities as tolerated yes Continue plan of care   []  Discharge due to :    []  Other: Therapist: Omer Babcock PTA    Date: 7/6/2017 Time: 8:07 PM     Future Appointments  Date Time Provider Adria Danuta   7/18/2017 9:30 AM [facility]   7/20/2017 9:00 AM [facility]   7/25/2017 9:30 AM [facility]   7/28/2017 9:00 AM [facility]   5/23/2018 8:45 AM Nanci Sandoval MD [facility]

## 2017-07-11 ENCOUNTER — APPOINTMENT (OUTPATIENT)
Dept: PHYSICAL THERAPY | Age: 72
End: 2017-07-11
Payer: MEDICARE

## 2017-07-14 ENCOUNTER — APPOINTMENT (OUTPATIENT)
Dept: PHYSICAL THERAPY | Age: 72
End: 2017-07-14
Payer: MEDICARE

## 2017-07-18 ENCOUNTER — HOSPITAL ENCOUNTER (OUTPATIENT)
Dept: PHYSICAL THERAPY | Age: 72
Discharge: HOME | End: 2017-07-18
Payer: MEDICARE

## 2017-07-18 PROCEDURE — 97110 THERAPEUTIC EXERCISES: CPT

## 2017-07-18 PROCEDURE — 97140 MANUAL THERAPY 1/> REGIONS: CPT

## 2017-07-18 NOTE — PROGRESS NOTES
PHYSICAL THERAPY - DAILY TREATMENT NOTE      Patient Name: Bradford Rodriguez        Date: 2017  : 1945   YES Patient  Verified  Visit #:     Insurance: Payor: Desiree Rodriguez / Plan: [place] MEDICARE PART A & B / Product Type: Medicare /      In time: 9:30 Out time: 10:35   Total Treatment Time: 65 min     Medicare Time Tracking (below)   Total Timed Codes (min):  55 1:1 Treatment Time:  55     TREATMENT AREA =  Right shoulder pain [M25.511]    SUBJECTIVE    Pain Level (on 0 to 10 scale):  0  / 10   Medication Changes/New allergies or changes in medical history, any new surgeries or procedures? NO    If yes, update Summary List   Subjective Functional Status/Changes:  []  No changes reported     \"I did the exercises when I was gone. \"        OBJECTIVE    Modalities Rationale:   min [] Estim, type/location:                                      []  att     []  unatt     []  w/US     []  w/ice    []  w/heat    min []  Mechanical Traction: type/lbs                   []  pro   []  sup   []  int   []  cont    []  before manual    []  after manual    min []  Ultrasound, settings/location:      min []  Iontophoresis w/ dexamethasone, location:                                               []  take home patch       []  in clinic   10 min [x]  Ice     []  Heat    location/position: R shoulder supine with BLEs on wedge    min []  Vasopneumatic Device, press/temp:     min []  Other:    [x] Skin assessment post-treatment (if applicable):    [x]  intact    []  redness- no adverse reaction     []redness - adverse reaction:      45 (45) min Therapeutic Exercise:  [x]  See flow sheet   Rationale:      increase ROM, increase strength, improve coordination, improve balance and increase proprioception to improve the patients ability to perform ADLs and overhead activities with increased ease      10 (10) min Manual Therapy: PROM flexion, rhythmic stabs 110 deg flexion   Rationale:      decrease pain, increase ROM, increase tissue extensibility and increase postural awareness to improve patient's ability to perform ADLs and overhead activities with increased ease     1 min Patient Education:  YES  Reviewed HEP   []  Progressed/Changed HEP based on: Other Objective/Functional Measures:    Initiated UBE 5' L1 without inc pain    ER AAROM w/ dowel 70 degrees with shoulder in 30 deg abduction  Initiated shoulder flexion/scaption AROM with mirror for biofeedback. Flexion 65 degrees, scaption 45 deg with minimal UT compensation    Added ER/IR isometrics      Post Treatment Pain Level (on 0 to 10) scale:   0  / 10     ASSESSMENT    Assessment/Changes in Function:     Patient tolerated AAROM and isometric strengthening progression well today. Difficulty with AROM and will require continued manual interventions to address PROM restrictions. []  See Progress Note/Recertification   Patient will continue to benefit from skilled PT services to modify and progress therapeutic interventions, address functional mobility deficits, address ROM deficits, address strength deficits, analyze and address soft tissue restrictions, analyze and cue movement patterns, analyze and modify body mechanics/ergonomics, assess and modify postural abnormalities and instruct in home and community integration to attain remaining goals. Progress toward goals / Updated goals:    New Goals to be achieved in __4__  weeks:  1. Patient will demonstrate compliance with HEP for symptom management at home. 2. Patient will improve R shoulder flexion PROM to 145 degrees in order to progress TE. 130 degrees today  3. Patient will improve R shoulder flexion AROM to 90 degrees with minimal UT compensation in order to improve ease with ADLs  4.  Patient will improve R shoulder abduction PROM to 120 degrees in order to improve ease with overhead duties       PLAN    [x]  Upgrade activities as tolerated YES Continue plan of care   []  Discharge due to :    [] Other:      Therapist: Blake Wood DPT    Date: 7/18/2017 Time: 9:06 AM     Future Appointments  Date Time Provider Adria Tipton   7/18/2017 9:30 AM Miranda [facility]   7/20/2017 9:00 AM Ananya [facility]   7/25/2017 9:30 AM Miranda [facility]   7/28/2017 9:00 AM Miranda [facility]   5/23/2018 8:45 AM Tarun Quintana MD [address] [facility]

## 2017-07-20 ENCOUNTER — HOSPITAL ENCOUNTER (OUTPATIENT)
Dept: PHYSICAL THERAPY | Age: 72
Discharge: HOME | End: 2017-07-20
Payer: MEDICARE

## 2017-07-20 PROCEDURE — 97140 MANUAL THERAPY 1/> REGIONS: CPT

## 2017-07-20 PROCEDURE — 97110 THERAPEUTIC EXERCISES: CPT

## 2017-07-20 NOTE — PROGRESS NOTES
PHYSICAL THERAPY - DAILY TREATMENT NOTE    Patient Name: Aliza Torrez        Date: 2017  : 1945   YES Patient  Verified  Visit #:   15      16  Insurance: Payor: Darin Gong / Plan: [place] MEDICARE PART A & B / Product Type: Medicare /      In time: 9:00 Out time: 10:00   Total Treatment Time: 60     Medicare Time Tracking (below)   Total Timed Codes (min):  50 1:1 Treatment Time:  40     TREATMENT AREA =  Right shoulder pain [M25.511]    SUBJECTIVE    Pain Level (on 0 to 10 scale):   14 / 10   Medication Changes/New allergies or changes in medical history, any new surgeries or procedures? NO     If yes, update Summary List   Subjective Functional Status/Changes:  []  No changes reported     \"I am a little more sore from doing more things around the house, like cleaning out he desk and cleaning the house. \"          OBJECTIVE    Modalities Rationale: To decrease pain and inflammation. min [] Estim, type/location:                                      []  att     []  unatt     []  w/US     []  w/ice    []  w/heat    min []  Mechanical Traction: type/lbs                   []  pro   []  sup   []  int   []  cont    []  before manual    []  after manual    min []  Ultrasound, settings/location:      min []  Iontophoresis w/ dexamethasone, location:                                               []  take home patch       []  in clinic   10 min [x]  Ice     []  Heat    location/position:     min []  Vasopneumatic Device, press/temp:     min []  Other:    [x] Skin assessment post-treatment (if applicable):   []  intact    []  redness- no adverse reaction     []redness - adverse reaction:     40 min Therapeutic Exercise:  [x]  See flow sheet   Rationale:      increase ROM and increase strength to improve the patients ability to perform ADL's with improved periscapular stability and AAROM.      10 min Manual Therapy:  PROM flexion, rythmic stabilization   Rationale:      decrease pain, increase ROM and increase tissue extensibility to improve patient's ability to perform [place] ADL's with improved flexibility. min Patient Education:  YES  Reviewed HEP   []  Progressed/Changed HEP based on:   Patient reports compliance     Other Objective/Functional Measures:    Pt requires vc's to perform theraband strengthening exercises with shoulder hiking and upper trapezius compensation. Pt is progressing slowly in flexion AAROM and is independent with his HEP. Post Treatment Pain Level (on 0 to 10) scale:   0  / 10     ASSESSMENT    Assessment/Changes in Function:     Pt presents to PT progressing appropriately in AROM. Will progress to next phase of protocol in 2 visits. []  See Progress Note/Recertification   Patient will continue to benefit from skilled PT services to modify and progress therapeutic interventions, address functional mobility deficits, address ROM deficits, address strength deficits, analyze and address soft tissue restrictions, analyze and cue movement patterns, analyze and modify body mechanics/ergonomics and assess and modify postural abnormalities to attain remaining goals. Progress toward goals / Updated goals:    LTG 1 MET. Addressing LTG 2-3 with AAROM HEP and manual techniques.      PLAN    [x]  Upgrade activities as tolerated YES Continue plan of care   []  Discharge due to :    []  Other:      Therapist: Do Murrieta    Date: 7/20/2017 Time: 9:02 AM     Future Appointments  Date Time Provider Adria Tipton   7/25/2017 9:30 AM Mach[facility]   7/28/2017 9:00 AM [facility]   8/1/2017 9:00 AM Do Murrieta [facility]   8/4/2017 9:30 AM Cruz Bang [facility]   8/8/2017 9:30 AM Cruz Bang [facility]   8/11/2017 10:00 AM Cruz Bang [facility]   8/14/2017 9:30 AM [facility]   8/18/2017 9:30 AM [facility]   8/24/2017 9:30 AM Cruz Bang [facility]   8/25/2017 8:30 AM Trina Gordon [facility]   8/28/2017 8:30 AM Ghulam Shen [facility]   8/31/2017 9:30 AM [facility]   5/23/2018 8:45 AM Trinh Wills MD [address] [facility]

## 2017-07-24 NOTE — PROGRESS NOTES
[facility] PHYSICAL THERAPY  [address] [place] [address], [address] - Phone: [phone]  Fax: [phone]  PROGRESS NOTE  Patient Name: Jes Rueda :    Treatment/Medical Diagnosis: Right shoulder pain [M25.511]   Referral Source: Emile Swan MD     Date of Initial Visit: 17 Attended Visits: 15 Missed Visits: 1     SUMMARY OF TREATMENT  Patient's POC has consisted of active warm-up on UBE, R shoulder AAROM/AROM interventions, manual therapy to address soft tissue and joint restrictions, gentle scapular therex/RTC strengthening without weights and instruction in comprehensive HEP focused on regaining full [facility] ROM. Key Functional Changes/Progress  Patient demonstrates objective improvements in all planes of motion since previous re-evaluation. Pt demo ER A/P ROM WNL. Pt continues to be limited with shoulder elevation and IR ROM. Pt reports GROC (Global rating of change) 6/7 and FOTO functional score improved from 25% to 54% today. Pt has tolerated progression to AROM and gentle scapulothoracic and RTC strengthening very well with minimal pain. R Shoulder ROM:  [] Unable to assess at this time                                                    AROM                                                               PROM    Right   Right   Flexion  110 Flexion 140   Extension  NT Extension NT    Scaption/ABD 70 Scaption/   ER @ 0 Degrees 75  ER @ 45 Degrees  80   IR HBB (cm from C7)  sacral base IR @ 45 degrees     ER @ 90 Degrees  78 ER @ 90 Degrees 82    IR @ 90 Degrees NT  IR @ 90 Degrees NT                           Goal/Measure of Progress Goal Met? 1. Patient will demonstrate compliance with HEP for symptom management at home. 2. Patient will improve R shoulder flexion PROM to 145 degrees in order to progress TE  3.  Patient will improve R shoulder flexion AROM to 90 degrees with minimal UT compensation in order to improve ease with ADLs  4. Patient will improve R shoulder abduction PROM to 120 degrees in order to improve ease with overhead duties 1. MET    2. Progressing (140 deg)    3. MET    4. Not Met     New Goals to be achieved in __4__  weeks:  1. Patient will demonstrate independence with HEP for symptom management at home. 2. Patient will improve R shoulder flexion AROM to 120 degrees with minimal UT compensation in order to improve ease with ADLs   3. Patient will improve R shoulder abduction PROM to 140 degrees in order to improve ease with overhead duties  4. Patient will improve R shoulder flexion PROM to 155 degrees in order to progress TE  Frequency / Duration:   Patient to be seen  2  times per week for 4  weeks:    G-Codes: Carry  Current  CK= 40-59%    Goal  CK= 40-59%. The severity rating is based on the FOTO Score     RECOMMENDATIONS  Continue and progress functional therex/therapeutic activity as able, utilizing manual therapy and modalities prn with focus on restoring full flexion/abduction ROM. Progress patient towards independent HEP to facilitate self-management of symptoms and progress gains after PT. If you have any questions/comments please contact us directly at [phone]. Thank you for allowing us to assist in the care of your patient. Therapist Signature: Irena Cooks, DPT Date: 6/12/8007   Certification Period: 5/24/17 - 8/23/17     Reporting Period 6/21/17 - 7/24/17   Time: 1:27 PM   NOTE TO PHYSICIAN:  PLEASE COMPLETE THE ORDERS BELOW AND FAX TO   [facility]: [phone].   If you are unable to process this request in 24 hours please contact our office: [phone].    ___ I have read the above report and request that my patient continue as recommended.   ___ I have read the above report and request that my patient continue therapy with the following changes/special instructions:_________________________________________________________   ___ I have read the above report and request that my patient be discharged from therapy.      Physician Signature:        Date:       Time:

## 2017-07-24 NOTE — PROGRESS NOTES
PHYSICAL THERAPY - DAILY TREATMENT NOTE      Patient Name: Rosaura Bonilla        Date: 2017  : 1945   YES Patient  Verified  Visit #:   15   of   24  Insurance: Payor: Ronal Haro / Plan: [place] MEDICARE PART A & B / Product Type: Medicare /      In time: 9:30 Out time: 10:15   Total Treatment Time: 45     Medicare Time Tracking (below)   Total Timed Codes (min):  45 1:1 Treatment Time:  40     TREATMENT AREA =  Right shoulder pain [M25.511]    SUBJECTIVE    Pain Level (on 0 to 10 scale):  0  / 10   Medication Changes/New allergies or changes in medical history, any new surgeries or procedures? NO    If yes, update Summary List   Subjective Functional Status/Changes:  []  No changes reported     \"I'm feeling good. \"        OBJECTIVE    35 (30) min Therapeutic Exercise:  [x]  See flow sheet   Rationale:      increase ROM, increase strength, improve coordination and increase proprioception to improve the patients ability to perform ADLs and overhead activities with increased ease      10 (10) min Therapeutic Activity: FOTO, Re-assessment   Rationale: To assess current functional limitations and review POC    1 min Patient Education:  YES  Reviewed HEP   []  Progressed/Changed HEP based on: Other Objective/Functional Measures:    See PN     Post Treatment Pain Level (on 0 to 10) scale:   0  / 10     ASSESSMENT    Assessment/Changes in Function:     See PN     []  See Progress Note/Recertification   Patient will continue to benefit from skilled PT services to modify and progress therapeutic interventions, address functional mobility deficits, address ROM deficits, address strength deficits, analyze and address soft tissue restrictions, analyze and cue movement patterns, analyze and modify body mechanics/ergonomics, assess and modify postural abnormalities and instruct in home and community integration to attain remaining goals.      Progress toward goals / Updated goals:    See PN PLAN    [x]  Upgrade activities as tolerated YES Continue plan of care   []  Discharge due to :    []  Other:      Therapist: Wilfredo Santoro DPT    Date: 7/24/2017 Time: 1:31 PM     Future Appointments  Date Time Provider Adria Tipton   7/25/2017 9:30 AM [facility]   7/28/2017 9:00 AM [facility]   8/1/2017 9:00 AM [facility]   8/4/2017 9:30 AM Erendira Client, [facility]   8/8/2017 9:30 AM Erendira Client, [facility]   8/11/2017 10:00 AM Erendira Client, [facility]   8/14/2017 9:30 AM Erendira Client, [facility]   8/18/2017 9:30 AM Erendira Client, [facility]   8/24/2017 9:30 AM [facility]   8/25/2017 8:30 AM [facility]   8/28/2017 8:30 AM Ellen Ledezma [facility]   8/31/2017 9:30 AM [facility]   5/23/2018 8:45 AM Jade Muñiz MD [facility]

## 2017-07-25 ENCOUNTER — HOSPITAL ENCOUNTER (OUTPATIENT)
Dept: PHYSICAL THERAPY | Age: 72
Discharge: HOME | End: 2017-07-25
Payer: MEDICARE

## 2017-07-25 PROCEDURE — 97110 THERAPEUTIC EXERCISES: CPT

## 2017-07-25 PROCEDURE — 97530 THERAPEUTIC ACTIVITIES: CPT

## 2017-07-25 PROCEDURE — G8985 CARRY GOAL STATUS: HCPCS

## 2017-07-25 PROCEDURE — G8984 CARRY CURRENT STATUS: HCPCS

## 2017-07-28 ENCOUNTER — APPOINTMENT (OUTPATIENT)
Dept: PHYSICAL THERAPY | Age: 72
End: 2017-07-28
Payer: MEDICARE

## 2017-08-01 ENCOUNTER — HOSPITAL ENCOUNTER (OUTPATIENT)
Dept: PHYSICAL THERAPY | Age: 72
Discharge: HOME | End: 2017-08-01
Payer: MEDICARE

## 2017-08-01 PROCEDURE — 97140 MANUAL THERAPY 1/> REGIONS: CPT

## 2017-08-01 PROCEDURE — 97110 THERAPEUTIC EXERCISES: CPT

## 2017-08-01 NOTE — PROGRESS NOTES
PHYSICAL THERAPY - DAILY TREATMENT NOTE    Patient Name: Kim Meza        Date: 2017  : 1945   YES Patient  Verified  Visit #:     Insurance: Payor: Velton Fend / Plan: [place] MEDICARE PART A & B / Product Type: Medicare /      In time: 8:55 Out time: 10:00   Total Treatment Time: 65     Medicare Time Tracking (below)   Total Timed Codes (min):  65 1:1 Treatment Time:  32     TREATMENT AREA =  Right shoulder pain [M25.511]    SUBJECTIVE    Pain Level (on 0 to 10 scale):  1-2  / 10   Medication Changes/New allergies or changes in medical history, any new surgeries or procedures? NO     If yes, update Summary List   Subjective Functional Status/Changes:  []  No changes reported     \"I still do my exercises at home. Especially the wall wash. \"          OBJECTIVE    55 (22) min Therapeutic Exercise:  [x]  See flow sheet   Rationale:      increase ROM and increase strength to improve the patients ability to improve periscapular stability. 10 min Manual Therapy: PROM all directions, posterior/anterior/inferior mob's grade 3-4, CR in all directions   Rationale:      decrease pain, increase ROM and increase tissue extensibility to improve patient's ability to perform ADL's with improved OH mobility. min Patient Education:  YES  Reviewed HEP   []  Progressed/Changed HEP based on:   Patient reports compliance     Other Objective/Functional Measures:    Pt has the most pain with abudction and requires vc's to perform wand exercises in the correct planes (pt was performing in scapular protraction decreasing efficiency of the stretch). Initiated TB rows and extensions without pain. Post Treatment Pain Level (on 0 to 10) scale:   0  / 10     ASSESSMENT    Assessment/Changes in Function:     Pt presents to PT progressing slowly in UE AROM.      []  See Progress Note/Recertification   Patient will continue to benefit from skilled PT services to modify and progress therapeutic interventions, address functional mobility deficits, address ROM deficits, address strength deficits, analyze and address soft tissue restrictions, analyze and cue movement patterns, analyze and modify body mechanics/ergonomics and assess and modify postural abnormalities to attain remaining goals. Progress toward goals / Updated goals:    First follow-up since last reassessment.      PLAN    [x]  Upgrade activities as tolerated YES Continue plan of care   []  Discharge due to :    []  Other:      Therapist: Bradly Randall    Date: 8/1/2017 Time: 9:38 AM     Future Appointments  Date Time Provider Adria Tipton   8/3/2017 1:30 PM Lulu Hancock [facility]   8/8/2017 9:30 AM Lulu Hancock [facility]   8/11/2017 10:00 AM Lulu Hancock [facility]   8/14/2017 9:30 AM Lulu Hancock [facility]   8/18/2017 9:30 AM [facility]   8/24/2017 9:30 AM [facility]   8/25/2017 8:30 AM [facility]   8/28/2017 8:30 AM Liana Anaay [facility]   8/31/2017 9:30 AM Bradly Randall [facility]   5/23/2018 8:45 AM Yvan Etienne MD [facility]

## 2017-08-03 ENCOUNTER — HOSPITAL ENCOUNTER (OUTPATIENT)
Dept: PHYSICAL THERAPY | Age: 72
Discharge: HOME | End: 2017-08-03
Payer: MEDICARE

## 2017-08-03 PROCEDURE — 97140 MANUAL THERAPY 1/> REGIONS: CPT

## 2017-08-03 PROCEDURE — 97110 THERAPEUTIC EXERCISES: CPT

## 2017-08-03 NOTE — PROGRESS NOTES
PHYSICAL THERAPY - DAILY TREATMENT NOTE    Patient Name: Ilene Schaeffer        Date: 8/3/2017  : 1945   yes Patient  Verified  Visit #:     Insurance: Payor: Ivett Art / Plan: [place] MEDICARE PART A & B / Product Type: Medicare /      In time: 130 Out time: 236   Total Treatment Time: 66     Medicare Time Tracking (below)   Total Timed Codes (min):  66 1:1 Treatment Time:  66     TREATMENT AREA =  Right shoulder pain [M25.511]    SUBJECTIVE  Pain Level (on 0 to 10 scale):  0/ 10   Medication Changes/New allergies or changes in medical history, any new surgeries or procedures?    no  If yes, update Summary List   Subjective Functional Status/Changes:  []  No changes reported     \"I am tired I been up since 5.  I couldn't open a bottle of soda, I didn't know I couldnt\"       OBJECTIVE      56 min Therapeutic Exercise:  [x]  See flow sheet   Rationale:      increase ROM and increase strength to improve the patients ability to  maintain ROM to facilitate ADLs per protocol        10 min Manual Therapy: post/ inf glides to GHJ with distraction; grade IV; STM/MFR to R pecs/UT/scap border, and AAROM in all planes    Rationale:      decrease pain, increase ROM, increase tissue extensibility, decrease trigger points and increase postural awareness to improve patient's ability to  maintain ROM to facilitate ADLs per protocol        min Patient Education:  yes  Reviewed HEP   []  Progressed/Changed HEP based on: Pt ed on importance and benefits of compliance with HEP, core strength/stability and proper posture; pt verbalized understanding        Other Objective/Functional Measures:    VCs + demo to perform proper technique for TE  initiated therabar and wrist roll with 0#, and hammock str without c/o p!    demos UT compensation with AAROM, min improvement with VCs    performed slow ecc lowering on finger ladder      Post Treatment Pain Level (on 0 to 10) scale:   0  / 10 ASSESSMENT  Assessment/Changes in Function:   Progressed there-ex without c/o increase p! []  See Progress Note/Recertification   Patient will continue to benefit from skilled PT services to modify and progress therapeutic interventions, address functional mobility deficits, address ROM deficits, address strength deficits, analyze and address soft tissue restrictions, analyze and cue movement patterns, analyze and modify body mechanics/ergonomics, assess and modify postural abnormalities and instruct in home and community integration to attain remaining goals. Progress toward goals / Updated goals:    New Goals to be achieved in __4__  weeks:  1. Patient will demonstrate independence with HEP for symptom management at home. 2. Patient will improve R shoulder flexion AROM to 120 degrees with minimal UT compensation in order to improve ease with ADLs   3. Patient will improve R shoulder abduction PROM to 140 degrees in order to improve ease with overhead duties  4. Patient will improve R shoulder flexion PROM to 155 degrees in order to progress TE.      PLAN  []  Upgrade activities as tolerated yes Continue plan of care   []  Discharge due to :    []  Other:      Therapist: Diana Chun PTA    Date: 8/3/2017 Time: 1:23 PM     Future Appointments  Date Time Provider Adria Tipton   8/3/2017 1:30 PM Diana Chun [facility]   8/8/2017 9:30 AM Diana Chun [facility]   8/11/2017 10:00 AM Diana Chun [facility]   8/14/2017 9:30 AM Diana Chun [facility]   [facility]   8/24/2017 9:30 AM [facility]   8/25/2017 8:30 AM [facility]   [facility]   [facility]   5/23/2018 8:45 AM Alex Stone MD [address] [place] [address] Drive

## 2017-08-04 ENCOUNTER — APPOINTMENT (OUTPATIENT)
Dept: PHYSICAL THERAPY | Age: 72
End: 2017-08-04
Payer: MEDICARE

## 2017-08-08 ENCOUNTER — HOSPITAL ENCOUNTER (OUTPATIENT)
Dept: PHYSICAL THERAPY | Age: 72
Discharge: HOME | End: 2017-08-08
Payer: MEDICARE

## 2017-08-08 PROCEDURE — 97110 THERAPEUTIC EXERCISES: CPT

## 2017-08-08 NOTE — PROGRESS NOTES
PHYSICAL THERAPY - DAILY TREATMENT NOTE    Patient Name: Lilliam Francis        Date: 2017  : 1945   yes Patient  Verified  Visit #:     Insurance: Payor: Deanna Holcomb / Plan: [place] MEDICARE PART A & B / Product Type: Medicare /      In time: 930 Out time: 1025   Total Treatment Time: 55     Medicare Time Tracking (below)   Total Timed Codes (min): 55 1:1 Treatment Time:  15     TREATMENT AREA =  Right shoulder pain [M25.511]    SUBJECTIVE  Pain Level (on 0 to 10 scale): 2/ 10   Medication Changes/New allergies or changes in medical history, any new surgeries or procedures?    no  If yes, update Summary List   Subjective Functional Status/Changes:  []  No changes reported     \"Its a little sore today, prob the rain\"       OBJECTIVE      55/15 min Therapeutic Exercise:  [x]  See flow sheet   Rationale:      increase ROM and increase strength to improve the patients ability to  maintain ROM to facilitate ADLs per protocol           min Patient Education:  yes  Reviewed HEP   []  Progressed/Changed HEP based on: Pt ed on importance and benefits of compliance with HEP, core strength/stability and proper posture; pt verbalized understanding        Other Objective/Functional Measures:    VCs + demo to perform proper technique for TE  demos SH AROM without UT compensation  AAROM IR to L4   Post Treatment Pain Level (on 0 to 10) scale:   0  / 10     ASSESSMENT  Assessment/Changes in Function:   Progressed there-ex without c/o increase p!      []  See Progress Note/Recertification   Patient will continue to benefit from skilled PT services to modify and progress therapeutic interventions, address functional mobility deficits, address ROM deficits, address strength deficits, analyze and address soft tissue restrictions, analyze and cue movement patterns, analyze and modify body mechanics/ergonomics, assess and modify postural abnormalities and instruct in home and community integration to attain remaining goals. Progress toward goals / Updated goals:    New Goals to be achieved in __4__  weeks:  1. Patient will demonstrate independence with HEP for symptom management at home. 2. Patient will improve R shoulder flexion AROM to 120 degrees with minimal UT compensation in order to improve ease with ADLs   3. Patient will improve R shoulder abduction PROM to 140 degrees in order to improve ease with overhead duties  4. Patient will improve R shoulder flexion PROM to 155 degrees in order to progress TE.      PLAN  []  Upgrade activities as tolerated yes Continue plan of care   []  Discharge due to :    []  Other:      Therapist: Luther Litten, PTA    Date: 8/8/2017 Time: 11:50 PM     Future Appointments  Date Time Provider Adria Tipton   8/11/2017 10:00 AM Luther Litten, [facility]   8/14/2017 9:30 AM Luther Litten, [facility]   8/18/2017 9:30 AM Luther Litten, [facility]   8/24/2017 9:30 AM [facility]   8/25/2017 8:30 AM [facility]   [facility]28[facility]2017 [facility]   8/31/2017 9:30 AM Tee Babin [facility]   5/23/2018 8:45 AM Ciera Beltran MD [address] [facility]

## 2017-08-11 ENCOUNTER — HOSPITAL ENCOUNTER (OUTPATIENT)
Dept: PHYSICAL THERAPY | Age: 72
Discharge: HOME | End: 2017-08-11
Payer: MEDICARE

## 2017-08-11 PROCEDURE — 97110 THERAPEUTIC EXERCISES: CPT

## 2017-08-11 NOTE — PROGRESS NOTES
PHYSICAL THERAPY - DAILY TREATMENT NOTE    Patient Name: Mariah Will        Date: 2017  : 1945   yes Patient  Verified  Visit #:     Insurance: Payor: Orlando Gutierrez / Plan: VA MEDICARE PART A & B / Product Type: Medicare /      In time: 1000 Out time: 1055   Total Treatment Time: 55     Medicare Time Tracking (below)   Total Timed Codes (min): 45 1:1 Treatment Time:  15     TREATMENT AREA =  Right shoulder pain [M25.511]    SUBJECTIVE  Pain Level (on 0 to 10 scale): 2/ 10   Medication Changes/New allergies or changes in medical history, any new surgeries or procedures?    no  If yes, update Summary List   Subjective Functional Status/Changes:  []  No changes reported     \"Its hurts in the front of the muscle part\"       OBJECTIVE      45/15 min Therapeutic Exercise:  [x]  See flow sheet   Rationale:      increase ROM and increase strength to improve the patients ability to  maintain ROM to facilitate ADLs per protocol           min Patient Education:  yes  Reviewed HEP   []  Progressed/Changed HEP based on: Pt ed on importance and benefits of compliance with HEP, core strength/stability and proper posture; pt verbalized understanding        Other Objective/Functional Measures:    VCs + demo to perform proper technique for TE  initiated doorway str and added 2.5# with wrist roller  performed body blade standing up and initiated FR up wall for sh flex AROM without c/o p!  demos fatigue with body blade    Post Treatment Pain Level (on 0 to 10) scale:   0  / 10     ASSESSMENT  Assessment/Changes in Function:   Progressed there-ex without c/o increase p!      []  See Progress Note/Recertification   Patient will continue to benefit from skilled PT services to modify and progress therapeutic interventions, address functional mobility deficits, address ROM deficits, address strength deficits, analyze and address soft tissue restrictions, analyze and cue movement patterns, analyze and modify body mechanics/ergonomics, assess and modify postural abnormalities and instruct in home and community integration to attain remaining goals. Progress toward goals / Updated goals:    New Goals to be achieved in __4__  weeks:  1. Patient will demonstrate independence with HEP for symptom management at home. 2. Patient will improve R shoulder flexion AROM to 120 degrees with minimal UT compensation in order to improve ease with ADLs   3. Patient will improve R shoulder abduction PROM to 140 degrees in order to improve ease with overhead duties  4. Patient will improve R shoulder flexion PROM to 155 degrees in order to progress TE.      PLAN  []  Upgrade activities as tolerated yes Continue plan of care   []  Discharge due to :    []  Other:      Therapist: Elliott Reyes PTA    Date: 8/11/2017 Time: 11:50 PM     Future Appointments  Date Time Provider Adria Tipton   8/14/2017 9:30 AM Elliott Reyes [facility]   8/18/2017 9:30 AM Elliott Reyes [facility]   8/24/2017 9:30 AM [facility]   8/25/2017 8:30 AM [facility]   8/28/2017 [facility]   8/31/2017 9:30 AM [facility]   5/23/2018 8:45 AM Johnathan Krishnan MD [address] [facility]

## 2017-08-14 ENCOUNTER — HOSPITAL ENCOUNTER (OUTPATIENT)
Dept: PHYSICAL THERAPY | Age: 72
Discharge: HOME | End: 2017-08-14
Payer: MEDICARE

## 2017-08-14 PROCEDURE — 97110 THERAPEUTIC EXERCISES: CPT

## 2017-08-14 NOTE — PROGRESS NOTES
PHYSICAL THERAPY - DAILY TREATMENT NOTE    Patient Name: Geno Estrada        Date: 2017  : 1945   yes Patient  Verified  Visit #:     Insurance: Payor: Rubin Ritesh / Plan: [place] MEDICARE PART A & B / Product Type: Medicare /      In time: 930 Out time: 1005   Total Treatment Time: 35     Medicare Time Tracking (below)   Total Timed Codes (min): 35 1:1 Treatment Time:  35     TREATMENT AREA =  Right shoulder pain [M25.511]    SUBJECTIVE  Pain Level (on 0 to 10 scale): 2/ 10   Medication Changes/New allergies or changes in medical history, any new surgeries or procedures?    no  If yes, update Summary List   Subjective Functional Status/Changes:  []  No changes reported     \"Its been hurting in the other one this weekend too, I guess I am over using it\"       OBJECTIVE       35 min Therapeutic Exercise:  [x]  See flow sheet   Rationale:      increase ROM and increase strength to improve the patients ability to  maintain ROM to facilitate ADLs per protocol           min Patient Education:  yes  Reviewed HEP   []  Progressed/Changed HEP based on: Pt ed on importance and benefits of compliance with HEP, core strength/stability and proper posture; pt verbalized understanding        Other Objective/Functional Measures:    VCs + demo to perform proper technique for TE  initiated wall push ups without c/o p! VCs to decrease fwd head with wall push ups       Post Treatment Pain Level (on 0 to 10) scale:   0  / 10     ASSESSMENT  Assessment/Changes in Function:   Progressed there-ex without c/o increase p!      []  See Progress Note/Recertification   Patient will continue to benefit from skilled PT services to modify and progress therapeutic interventions, address functional mobility deficits, address ROM deficits, address strength deficits, analyze and address soft tissue restrictions, analyze and cue movement patterns, analyze and modify body mechanics/ergonomics, assess and modify postural abnormalities and instruct in home and community integration to attain remaining goals. Progress toward goals / Updated goals:    New Goals to be achieved in __4__  weeks:  1. Patient will demonstrate independence with HEP for symptom management at home. 2. Patient will improve R shoulder flexion AROM to 120 degrees with minimal UT compensation in order to improve ease with ADLs   3. Patient will improve R shoulder abduction PROM to 140 degrees in order to improve ease with overhead duties  4. Patient will improve R shoulder flexion PROM to 155 degrees in order to progress TE.      PLAN  []  Upgrade activities as tolerated yes Continue plan of care   []  Discharge due to :    []  Other:      Therapist: Juliano Medina PTA    Date: 8/14/2017 Time: 11:50 PM     Future Appointments  Date Time Provider Adria Tipton   8/18/2017 9:30 AM [facility]   8/24/2017 9:30 AM [facility]   8/25/2017 8:30 AM [facility]   8/28/2017 8:30 AM Hanna Melton [facility]   8/31/2017 9:30 AM [facility]   5/23/2018 8:45 AM Niesha Henry MD [address] [place] [address]

## 2017-08-18 ENCOUNTER — HOSPITAL ENCOUNTER (OUTPATIENT)
Dept: PHYSICAL THERAPY | Age: 72
Discharge: HOME | End: 2017-08-18
Payer: MEDICARE

## 2017-08-18 PROCEDURE — 97110 THERAPEUTIC EXERCISES: CPT

## 2017-08-18 NOTE — PROGRESS NOTES
PHYSICAL THERAPY - DAILY TREATMENT NOTE    Patient Name: Antonella Escobar        Date: 2017  : 1945   yes Patient  Verified  Visit #:     Insurance: Payor: Lelia Escobar / Plan: VA MEDICARE PART A & B / Product Type: Medicare /      In time: 672 Out time: 1000   Total Treatment Time: 40     Medicare Time Tracking (below)   Total Timed Codes (min): 40 1:1 Treatment Time:  25     TREATMENT AREA =  Right shoulder pain [M25.511]    SUBJECTIVE  Pain Level (on 0 to 10 scale): 0/ 10   Medication Changes/New allergies or changes in medical history, any new surgeries or procedures?    no  If yes, update Summary List   Subjective Functional Status/Changes:  []  No changes reported     \"I am telling you this L arm is sore from working hard\"       OBJECTIVE       40/25 min Therapeutic Exercise:  [x]  See flow sheet   Rationale:      increase ROM and increase strength to improve the patients ability to  maintain ROM to facilitate ADLs per protocol           min Patient Education:  yes  Reviewed HEP   []  Progressed/Changed HEP based on: Pt ed on importance and benefits of compliance with HEP, core strength/stability and proper posture; pt verbalized understanding        Other Objective/Functional Measures:    VCs + demo to perform proper technique for TE  increased reps without c/o p!  demos min UT compensation  VCs to keep R elbow add with ER/IR with 0 degs of abd  R SH flex AAROM ~ 145  degs     Post Treatment Pain Level (on 0 to 10) scale:   0  / 10     ASSESSMENT  Assessment/Changes in Function:   Progressed there-ex without c/o increase p!      []  See Progress Note/Recertification   Patient will continue to benefit from skilled PT services to modify and progress therapeutic interventions, address functional mobility deficits, address ROM deficits, address strength deficits, analyze and address soft tissue restrictions, analyze and cue movement patterns, analyze and modify body mechanics/ergonomics, assess and modify postural abnormalities and instruct in home and community integration to attain remaining goals. Progress toward goals / Updated goals:    New Goals to be achieved in __4__  weeks:  1. Patient will demonstrate independence with HEP for symptom management at home. 2. Patient will improve R shoulder flexion AROM to 120 degrees with minimal UT compensation in order to improve ease with ADLs. progressing; R SH flex AAROM ~ 145  degs  3. Patient will improve R shoulder abduction PROM to 140 degrees in order to improve ease with overhead duties  4.  Patient will improve R shoulder flexion PROM to 155 degrees in order to progress TE. progressing; R SH flex AAROM ~ 145  degs       PLAN  []  Upgrade activities as tolerated yes Continue plan of care   []  Discharge due to :    []  Other: assess AROM and goals NV     Therapist: Diana Chun PTA    Date: 8/18/2017 Time: 7:40 PM     Future Appointments  Date Time Provider Adria Tipton   8/18/2017 9:30 AM [facility]   8/24/2017 9:30 AM [facility]   8/25/2017 8:30 AM [facility]   8/28/2017 8:30 AM Bruce De Leon [facility]   8/31/2017 9:30 AM Lizzyn Soulier [facility]   5/23/2018 8:45 AM Megan Kruse MD [address]

## 2017-08-24 ENCOUNTER — HOSPITAL ENCOUNTER (OUTPATIENT)
Dept: PHYSICAL THERAPY | Age: 72
Discharge: HOME | End: 2017-08-24
Payer: MEDICARE

## 2017-08-24 PROCEDURE — 97110 THERAPEUTIC EXERCISES: CPT

## 2017-08-24 NOTE — PROGRESS NOTES
PHYSICAL THERAPY - DAILY TREATMENT NOTE    Patient Name: Venia Dance        Date: 2017  : 1945   yes Patient  Verified  Visit #:     Insurance: Payor: Michael Curtis / Plan: [place] MED[place]ARE PART A & B / Product Type: Medicare /      In time: 930 Out time: 1020   Total Treatment Time: 40     Medicare Time Tracking (below)   Total Timed Codes (min): 40 1:1 Treatment Time:  15     TREATMENT AREA =  Right shoulder pain [M25.511]    SUBJECTIVE  Pain Level (on 0 to 10 scale): 0/ 10   Medication Changes/New allergies or changes in medical history, any new surgeries or procedures?    no  If yes, update Summary List   Subjective Functional Status/Changes:  []  No changes reported     \"Its tingling and funny feeling in the        OBJECTIVE       40/15 min Therapeutic Exercise:  [x]  See flow sheet   Rationale:      increase ROM and increase strength to improve the patients ability to  maintain ROM to facilitate ADLs per protocol           min Patient Education:  yes  Reviewed HEP   []  Progressed/Changed HEP based on: Pt ed on importance and benefits of compliance with HEP, core strength/stability and proper posture; pt verbalized understanding        Other Objective/Functional Measures:    VCs + demo to perform proper technique for TE  performed body blade standing with min UT compensation   initiated wall Vs with min c/o p! at end range     Post Treatment Pain Level (on 0 to 10) scale:   0  / 10     ASSESSMENT  Assessment/Changes in Function:   Progressed there-ex without c/o increase p!      []  See Progress Note/Recertification   Patient will continue to benefit from skilled PT services to modify and progress therapeutic interventions, address functional mobility deficits, address ROM deficits, address strength deficits, analyze and address soft tissue restrictions, analyze and cue movement patterns, analyze and modify body mechanics/ergonomics, assess and modify postural abnormalities and instruct in home and community integration to attain remaining goals. Progress toward goals / Updated goals:    New Goals to be achieved in __4__  weeks:  1. Patient will demonstrate independence with HEP for symptom management at home. 2. Patient will improve R shoulder flexion AROM to 120 degrees with minimal UT compensation in order to improve ease with ADLs. progressing; R SH flex AAROM ~ 145  degs  3. Patient will improve R shoulder abduction PROM to 140 degrees in order to improve ease with overhead duties  4.  Patient will improve R shoulder flexion PROM to 155 degrees in order to progress TE. progressing; R SH flex AAROM ~ 145  degs       PLAN  []  Upgrade activities as tolerated yes Continue plan of care   []  Discharge due to :    []  Other: assess AROM and goals NV     Therapist: Zackery Lang PTA    Date: 8/24/2017 Time: 3:15 PM     Future Appointments  Date Time Provider Adria Tipton   8/25/2017 8:30 AM [facility]   8/28/2017 8:30 AM [facility]   8/31/2017 9:30 AM [facility]   5/23/2018 8:45 AM Tonie Boothe MD [address] [facility]

## 2017-08-25 ENCOUNTER — HOSPITAL ENCOUNTER (OUTPATIENT)
Dept: PHYSICAL THERAPY | Age: 72
Discharge: HOME | End: 2017-08-25
Payer: MEDICARE

## 2017-08-25 PROCEDURE — 97530 THERAPEUTIC ACTIVITIES: CPT

## 2017-08-25 PROCEDURE — G8986 CARRY D/C STATUS: HCPCS

## 2017-08-25 PROCEDURE — 97110 THERAPEUTIC EXERCISES: CPT

## 2017-08-25 PROCEDURE — 97140 MANUAL THERAPY 1/> REGIONS: CPT

## 2017-08-25 PROCEDURE — G8985 CARRY GOAL STATUS: HCPCS

## 2017-08-25 NOTE — PROGRESS NOTES
[facility]  [address] [place] [address], [address] - Phone: [phone]  Fax: [phone]  PROGRESS NOTE  Patient Name: Alex Barboza :    Treatment/Medical Diagnosis: Right shoulder pain [M25.511]   Referral Source: Alka Dempsey MD     Date of Initial Visit: 17 Attended Visits: 23 Missed Visits: 2     SUMMARY OF TREATMENT  Patient's POC has consisted of active warm-up on UBE, R shoulder AAROM/AROM interventions, sub-max RTC strengthening,  manual therapy to address soft tissue and joint restrictions, and instruction in comprehensive HEP focused on regaining full [facility] ROM. Key Functional Changes/Progress  Pt demonstrates objective gains in all shoulder planes of motion since previous re-evaluation (see objective data below for measures). Pt's FOTO functional score has improved from 55% to 75% over the past month indicating decreased disability. Pt has minimal R shoulder pain (0-1/10) and does not rely on prescription medication for pain relief. Pt continues to demonstrate a firm capsular end feel with flexion and abduction/scaption PROM. R Shoulder ROM:                                                     AROM                                                               PROM     Right    Right   Flexion  122 Flexion 140   Scaption/ Scaption/   ER @ 0 Degrees 75 ER @ 45 Degrees 85   IR HBB (cm from C7) L5 IR @ 45 degrees NT   ER @ 90 Degrees 64 ER @ 90 Degrees 85   IR @ 90 Degrees  NT  IR @ 90 Degrees NT         Goal/Measure of Progress Goal Met? 1. Patient will demonstrate independence with HEP for symptom management at home. 2. Patient will improve R shoulder flexion AROM to 120 degrees with minimal UT compensation in order to improve ease with ADLs. 3. Patient will improve R shoulder abduction PROM to 140 degrees in order to improve ease with overhead duties  4.  Patient will improve R shoulder flexion PROM to 155 degrees in order to progress TE. 1. Progressing    2. MET    3. Progressing    4. Progressing     New Goals to be achieved in __4__  weeks:   1. Patient will demonstrate independence with HEP for symptom management at home. 2. Patient will improve R shoulder flexion AROM to 135 degrees with minimal UT compensation in order to improve ease with ADLs. 3. Patient will improve R shoulder abduction PROM to 140 degrees in order to improve ease with overhead duties  4. Patient will improve R shoulder scaption AROM to 125 degrees in order to improve ease with ADLs  5. Patient will improve Quick DASH to < 19 in order to demo decreased disability of R shoulder    Frequency / Duration:   Patient to be seen  1-2  times per week for 4-6  weeks:    G-Codes: Carry   Goal  CK= 40-59%   D/C  CJ= 20-39%. The severity rating is based on the FOTO Score    RECOMMENDATIONS  Continue and progress functional therex/therapeutic activity as able, utilizing manual therapy and modalities prn. Progress patient towards independent HEP to facilitate self-management of symptoms and progress gains after PT. If you have any questions/comments please contact us directly at [phone]. Thank you for allowing us to assist in the care of your patient. Therapist Signature: Louisa Verdin DPT Date: 8/54/7914   Certification Period: 5/24/17 - 10/23/17     Reporting Period   7/24/17 - 8/25/17 Time: 7:22 AM   NOTE TO PHYSICIAN:  PLEASE COMPLETE THE ORDERS BELOW AND FAX TO   [facility]: [phone].   If you are unable to process this request in 24 hours please contact our office: [phone].    ___ I have read the above report and request that my patient continue as recommended.   ___ I have read the above report and request that my patient continue therapy with the following changes/special instructions:_________________________________________________________   ___ I have read the above report and request that my patient be discharged from therapy.      Physician Signature:        Date:       Time:

## 2017-08-25 NOTE — PROGRESS NOTES
PHYSICAL THERAPY - DAILY TREATMENT NOTE      Patient Name: Jackelin Hammer        Date: 2017  : 1945   YES Patient  Verified  Visit #:     Insurance: Payor: Kelly Loya / Plan: [place] MEDICARE PART A & B / Product Type: Medicare /      In time: 8:20 Out time: 9:30   Total Treatment Time: 70 min     Medicare Time Tracking (below)   Total Timed Codes (min):  60 1:1 Treatment Time:  60     TREATMENT AREA =  Right shoulder pain [M25.511]    SUBJECTIVE    Pain Level (on 0 to 10 scale):  0  / 10   Medication Changes/New allergies or changes in medical history, any new surgeries or procedures? NO    If yes, update Summary List   Subjective Functional Status/Changes:  []  No changes reported     \"I'm going to see the doctor after my appointment here. \"        OBJECTIVE    Modalities Rationale:        min [] Estim, type/location:                                      []  att     []  unatt     []  w/US     []  w/ice    []  w/heat    min []  Mechanical Traction: type/lbs                   []  pro   []  sup   []  int   []  cont    []  before manual    []  after manual    min []  Ultrasound, settings/location:      min []  Iontophoresis w/ dexamethasone, location:                                               []  take home patch       []  in clinic   10 min [x]  Ice     []  Heat    location/position: Supine to R shoulder BLEs on wedge    min []  Vasopneumatic Device, press/temp:     min []  Other:    [x] Skin assessment post-treatment (if applicable):    [x]  intact    []  redness- no adverse reaction     []redness - adverse reaction:      30 (30) min Therapeutic Exercise:  [x]  See flow sheet   Rationale:      increase ROM, increase strength, improve coordination and increase proprioception to improve the patients ability to perform ADLs and overhead activities with increased ease      10 (10) min Therapeutic Activity: FOTO, Re-assessment   Rationale:  To assess current functional limitations and review POC    10 (10) min Manual Therapy: PROM flexion, abd, IR, DTM/TrP release to pec major/minor, external rotators    Rationale:      decrease pain, increase ROM, increase tissue extensibility, decrease trigger points and increase postural awareness to improve patient's ability to perform ADLs and overhead activities with increased ease     1 min Patient Education:  YES  Reviewed HEP   []  Progressed/Changed HEP based on: Other Objective/Functional Measures:    . See PN     Post Treatment Pain Level (on 0 to 10) scale:   0  / 10     ASSESSMENT    Assessment/Changes in Function:     See PN     []  See Progress Note/Recertification   Patient will continue to benefit from skilled PT services to modify and progress therapeutic interventions, address functional mobility deficits, address ROM deficits, address strength deficits, analyze and address soft tissue restrictions, analyze and cue movement patterns, analyze and modify body mechanics/ergonomics, assess and modify postural abnormalities, address imbalance/dizziness and instruct in home and community integration to attain remaining goals.      Progress toward goals / Updated goals:    SEe PN     PLAN    [x]  Upgrade activities as tolerated YES Continue plan of care   []  Discharge due to :    []  Other:      Therapist: Mike Sandoval DPT    Date: 8/25/2017 Time: 9:42 AM     Future Appointments  Date Time Provider Adria Tipton   8/28/2017 8:30 AM [facility]   8/31/2017 9:30 AM [facility]   5/23/2018 8:45 AM Johnathan Krishnan MD [address]

## 2017-08-28 ENCOUNTER — HOSPITAL ENCOUNTER (OUTPATIENT)
Dept: PHYSICAL THERAPY | Age: 72
Discharge: HOME | End: 2017-08-28
Payer: MEDICARE

## 2017-08-28 PROCEDURE — 97140 MANUAL THERAPY 1/> REGIONS: CPT

## 2017-08-28 PROCEDURE — 97110 THERAPEUTIC EXERCISES: CPT

## 2017-08-28 PROCEDURE — G8985 CARRY GOAL STATUS: HCPCS

## 2017-08-28 PROCEDURE — G8984 CARRY CURRENT STATUS: HCPCS

## 2017-08-28 NOTE — PROGRESS NOTES
PHYSICAL THERAPY - DAILY TREATMENT NOTE      Patient Name: Marielos Osborne        Date: 2017  : 1945   YES Patient  Verified  Visit #:     Insurance: Payor: Kirk Hammond / Plan: [place] MEDICARE PART A & B / Product Type: Medicare /      In time: 8:30 Out time: 9:25   Total Treatment Time: 55 min     Medicare Time Tracking (below)   Total Timed Codes (min):  55 1:1 Treatment Time:  55     TREATMENT AREA =  Right shoulder pain [M25.511]    SUBJECTIVE    Pain Level (on 0 to 10 scale):  0  / 10   Medication Changes/New allergies or changes in medical history, any new surgeries or procedures? NO    If yes, update Summary List   Subjective Functional Status/Changes:  []  No changes reported      \"The PA was happy with my progress. \"        OBJECTIVE    40 (40) min Therapeutic Exercise:  [x]  See flow sheet   Rationale:      increase ROM, increase strength, improve coordination and increase proprioception to improve the patients ability to perform ADLs and overhead activities with increased ease      15 (15) min Manual Therapy: PROM flexion, abd, IR, DTM/TrP release to pec major/minor, external rotators    Rationale:      decrease pain, increase ROM, increase tissue extensibility and increase postural awareness to improve patient's ability to perform ADLs and overhead activities with increased ease     1 min Patient Education:  YES  Reviewed HEP   []  Progressed/Changed HEP based on: Other Objective/Functional Measures:    Progressed to concentric TB ER with yellow tband. Pt with inc difficulty and soreness following 2x10  Inc reps/sets of UE therex per flowsheet      Post Treatment Pain Level (on 0 to 10) scale:   0  / 10     ASSESSMENT    Assessment/Changes in Function:     Progressing flexion/scaption AROM. Continued limitations with IR ROM.      []  See Progress Note/Recertification   Patient will continue to benefit from skilled PT services to modify and progress therapeutic interventions, address functional mobility deficits, address ROM deficits, address strength deficits, analyze and address soft tissue restrictions, analyze and cue movement patterns, analyze and modify body mechanics/ergonomics, assess and modify postural abnormalities and instruct in home and community integration to attain remaining goals.      Progress toward goals / Updated goals:    First visit since re-evaluation; no progress noted     PLAN    [x]  Upgrade activities as tolerated YES Continue plan of care   []  Discharge due to :    []  Other:      Therapist: Wilfredo Santoro DPT    Date: 8/28/2017 Time: 7:37 AM     Future Appointments  Date Time Provider Adria Tipton   8/28/2017 8:30 AM [facility]   8/31/2017 9:30 AM [facility] AT [facility]   5/23/2018 8:45 AM Jade Muñiz MD [address] [facility]

## 2017-08-31 ENCOUNTER — HOSPITAL ENCOUNTER (OUTPATIENT)
Dept: PHYSICAL THERAPY | Age: 72
Discharge: HOME | End: 2017-08-31
Payer: MEDICARE

## 2017-08-31 PROCEDURE — 97140 MANUAL THERAPY 1/> REGIONS: CPT

## 2017-08-31 PROCEDURE — 97110 THERAPEUTIC EXERCISES: CPT

## 2017-08-31 NOTE — PROGRESS NOTES
PHYSICAL THERAPY - DAILY TREATMENT NOTE    Patient Name: Alex Barboza        Date: 2017  : 1945   YES Patient  Verified  Visit #:     Insurance: Payor: Jaya Howard / Plan: [place] MEDICARE PART A & B / Product Type: Medicare /      In time: 9:20 Out time: 10:30   Total Treatment Time: 70     Medicare Time Tracking (below)   Total Timed Codes (min):  70 1:1 Treatment Time:  62     TREATMENT AREA =  Right shoulder pain [M25.511]    SUBJECTIVE    Pain Level (on 0 to 10 scale):  1  / 10   Medication Changes/New allergies or changes in medical history, any new surgeries or procedures? NO     If yes, update Summary List   Subjective Functional Status/Changes:  []  No changes reported     \"I feel pretty good other than my neck hurting me. I'm seeing someone about that this week. \"          OBJECTIVE      60 (52) min Therapeutic Exercise:  [x]  See flow sheet   Rationale:      increase ROM and increase strength to improve the patients ability to perform ADL's with improved periscapular stability. 10 min Manual Therapy: PROM all directions, TPR lower trapezius/subscapularis, CR in all directions   Rationale:      decrease pain, increase ROM and increase tissue extensibility to improve patient's ability to perform ADL's with improved OH flexibility. min Patient Education:  YES  Reviewed HEP   []  Progressed/Changed HEP based on:   Patient reports compliance     Other Objective/Functional Measures:    Pt demonstrates full ER AROM in supine, but is limited in standing due to weakness. Pt demonstrates functional OH AROM, but is most limited in IR AROM. Therefore added sleeper stretch in addition to persicapular strengthening exercises. Post Treatment Pain Level (on 0 to 10) scale:   0  / 10     ASSESSMENT    Assessment/Changes in Function:     Pt presents to PT progressing appropriately in periscapular stability and OH AROM without pain.      []  See Progress Note/Recertification Patient will continue to benefit from skilled PT services to modify and progress therapeutic interventions, address functional mobility deficits, address ROM deficits, address strength deficits, analyze and address soft tissue restrictions, analyze and cue movement patterns and analyze and modify body mechanics/ergonomics to attain remaining goals. Progress toward goals / Updated goals: Addressed LTG's 1-3 with manual interventions and AAROM exercises.      PLAN    [x]  Upgrade activities as tolerated YES Continue plan of care   []  Discharge due to :    []  Other:      Therapist: Bradly Randall    Date: 8/31/2017 Time: 11:34 AM     Future Appointments  Date Time Provider Adria Tipton   9/8/2017 8:30 AM [facility]   9/15/2017 11:00 AM [facility]   9/19/2017 9:30 AM [facility]   9/27/2017 8:30 AM [facility]   5/23/2018 8:45 AM Yvan Etienne MD [address] [place] [address]

## 2017-09-08 ENCOUNTER — HOSPITAL ENCOUNTER (OUTPATIENT)
Dept: PHYSICAL THERAPY | Age: 72
Discharge: HOME | End: 2017-09-08
Payer: MEDICARE

## 2017-09-08 PROCEDURE — 97110 THERAPEUTIC EXERCISES: CPT

## 2017-09-08 NOTE — PROGRESS NOTES
PHYSICAL THERAPY - DAILY TREATMENT NOTE      Patient Name: Ashley Mendoza        Date: 2017  : 1945   YES Patient  Verified  Visit #:     Insurance: Payor: Ninfa Galvan / Plan: [place] MEDICARE PART A & B / Product Type: Medicare /      In time: 8:30 Out time: 9:20   Total Treatment Time: 50 min     Medicare Time Tracking (below)   Total Timed Codes (min):  40 1:1 Treatment Time:  40     TREATMENT AREA =  Right shoulder pain [M25.511]    SUBJECTIVE    Pain Level (on 0 to 10 scale):  0  / 10   Medication Changes/New allergies or changes in medical history, any new surgeries or procedures? NO    If yes, update Summary List   Subjective Functional Status/Changes:  []  No changes reported     \"I've been giving in too much work out some times. \"        OBJECTIVE    Modalities Rationale:        min [] Estim, type/location:                                      []  att     []  unatt     []  w/US     []  w/ice    []  w/heat    min []  Mechanical Traction: type/lbs                   []  pro   []  sup   []  int   []  cont    []  before manual    []  after manual    min []  Ultrasound, settings/location:      min []  Iontophoresis w/ dexamethasone, location:                                               []  take home patch       []  in clinic   10 min [x]  Ice     []  Heat    location/position: R shoulder supine    min []  Vasopneumatic Device, press/temp:     min []  Other:    [x] Skin assessment post-treatment (if applicable):    [x]  intact    []  redness- no adverse reaction     []redness - adverse reaction:       40 (40) min Therapeutic Exercise:  [x]  See flow sheet   Rationale:      increase ROM, increase strength, improve coordination and increase proprioception to improve the patients ability to perform ADLs and overhead activities with increased ease      1 min Patient Education:  YES  Reviewed HEP   []  Progressed/Changed HEP based on:         Other Objective/Functional Measures: Improved form with GTB ER walkaways and concentric ER 2x10x5\"  Progressed reps/sets per flowsheet     Post Treatment Pain Level (on 0 to 10) scale:   0  / 10     ASSESSMENT    Assessment/Changes in Function:     Pt progressing well towards established goals. R shoulder AROM improving steadily and pt reports inc ease with ADLs and overhead tasks. []  See Progress Note/Recertification   Patient will continue to benefit from skilled PT services to modify and progress therapeutic interventions, address functional mobility deficits, address ROM deficits, address strength deficits, analyze and address soft tissue restrictions, analyze and cue movement patterns, analyze and modify body mechanics/ergonomics, assess and modify postural abnormalities, address imbalance/dizziness and instruct in home and community integration to attain remaining goals. Progress toward goals / Updated goals: Addressed LTG's 1-3 with manual interventions and AAROM exercises.      PLAN    [x]  Upgrade activities as tolerated YES Continue plan of care   []  Discharge due to :    []  Other:      Therapist: Angela Evans DPT    Date: 9/8/2017 Time: 8:31 AM     Future Appointments  Date Time Provider Adria Tipton   9/15/2017 11:00 AM [facility]   9/19/2017 9:30 AM [facility]   9/27/2017 8:30 AM [facility]   5/23/2018 8:45 AM Jose Hunter MD [facility]

## 2017-09-15 ENCOUNTER — HOSPITAL ENCOUNTER (OUTPATIENT)
Dept: PHYSICAL THERAPY | Age: 72
Discharge: HOME | End: 2017-09-15
Payer: MEDICARE

## 2017-09-15 PROCEDURE — 97110 THERAPEUTIC EXERCISES: CPT

## 2017-09-15 NOTE — PROGRESS NOTES
PHYSICAL THERAPY - DAILY TREATMENT NOTE      Patient Name: Vi Palacios        Date: 9/15/2017  : 1945   YES Patient  Verified  Visit #:   32   of   -36  Insurance: Payor: Rebecca  / Plan: [place] MEDICARE PART A & B / Product Type: Medicare /      In time: 10:50 Out time: 11:35   Total Treatment Time: 45 min     Medicare Time Tracking (below)   Total Timed Codes (min):  45 1:1 Treatment Time:  25     TREATMENT AREA =  Right shoulder pain [M25.511]    SUBJECTIVE    Pain Level (on 0 to 10 scale):  0  / 10   Medication Changes/New allergies or changes in medical history, any new surgeries or procedures? NO    If yes, update Summary List   Subjective Functional Status/Changes:  []  No changes reported     \"I had a little pain when I reached out to change the radio station. \"        OBJECTIVE    45 (25) min Therapeutic Exercise:  [x]  See flow sheet   Rationale:      increase ROM, increase strength, improve coordination, improve balance and increase proprioception to improve the patients ability to perform ADLs and overhead activities with increased ease      1 min Patient Education:  YES  Reviewed HEP   []  Progressed/Changed HEP based on: Other Objective/Functional Measures:    Progressed body blade to D1/D2 flexion standing  Resumed posterior capsule stretch to address patient's c/o inc posterior shoulder pain/tightness with reaching forward. Post Treatment Pain Level (on 0 to 10) scale:   0  / 10     ASSESSMENT    Assessment/Changes in Function:     Pt progressing well towards established goals. R shoulder AROM improving steadily and pt reports inc ease with ADLs and overhead tasks.      []  See Progress Note/Recertification   Patient will continue to benefit from skilled PT services to modify and progress therapeutic interventions, address functional mobility deficits, address ROM deficits, address strength deficits, analyze and address soft tissue restrictions, analyze and cue movement patterns, analyze and modify body mechanics/ergonomics, assess and modify postural abnormalities and instruct in home and community integration to attain remaining goals. Progress toward goals / Updated goals:     Addressed LTG with therex progression       PLAN    [x]  Upgrade activities as tolerated YES Continue plan of care   []  Discharge due to :    []  Other:      Therapist: Jacey Chi DPT    Date: 9/15/2017 Time: 10:13 AM     Future Appointments  Date Time Provider Adria Tipton   9/15/2017 11:00 AM [facility]   9/19/2017 9:30 AM [facility]   9/27/2017 8:30 AM [facility]   5/23/2018 8:45 AM Niesha Henry MD [address] [facility]

## 2017-09-19 ENCOUNTER — HOSPITAL ENCOUNTER (OUTPATIENT)
Dept: PHYSICAL THERAPY | Age: 72
Discharge: HOME | End: 2017-09-19
Payer: MEDICARE

## 2017-09-19 PROCEDURE — 97110 THERAPEUTIC EXERCISES: CPT

## 2017-09-19 NOTE — PROGRESS NOTES
PHYSICAL THERAPY - DAILY TREATMENT NOTE      Patient Name: Ilene Schaeffer        Date: 2017  : 1945   YES Patient  Verified  Visit #:     Insurance: Payor: Ivett Art / Plan: [place] [place] & B / Product Type: Medicare /      In time: 9:30 Out time: 10:20   Total Treatment Time: 50     Medicare Time Tracking (below)   Total Timed Codes (min): 40 1:1 Treatment Time:  26     TREATMENT AREA =  Right shoulder pain [M25.511]    SUBJECTIVE    Pain Level (on 0 to 10 scale):  0  / 10   Medication Changes/New allergies or changes in medical history, any new surgeries or procedures? NO    If yes, update Summary List   Subjective Functional Status/Changes:  []  No changes reported     \"It just feels tight today. I want to get more motion out of it. \"        OBJECTIVE    Modalities Rationale:   min [] Estim, type/location:                                      []  att     []  unatt     []  w/US     []  w/ice    []  w/heat    min []  Mechanical Traction: type/lbs                   []  pro   []  sup   []  int   []  cont    []  before manual    []  after manual    min []  Ultrasound, settings/location:      min []  Iontophoresis w/ dexamethasone, location:                                               []  take home patch       []  in clinic   10 min [x]  Ice     []  Heat    location/position: R shoulder supine    min []  Vasopneumatic Device, press/temp:     min []  Other:    [x] Skin assessment post-treatment (if applicable):    [x]  intact    []  redness- no adverse reaction     []redness - adverse reaction:      40  (26) min Therapeutic Exercise:  [x]  See flow sheet   Rationale:      increase ROM, increase strength, improve coordination and improve balance to improve the patients ability to perform ADLs and overhead activities with increased ease      1 min Patient Education:  YES  Reviewed HEP   []  Progressed/Changed HEP based on:         Other Objective/Functional Measures:    Progressed to 90/90 ER/IR. Pt demo inability to sustain even 45 deg of shoulder abduction  Reviewed posterior capsule stretch to address patient's c/o inc posterior shoulder pain/tightness with reaching forward     R shoulder flexion AROM 135 degrees, AAROM at finger ladder 140 degrees     Post Treatment Pain Level (on 0 to 10) scale:   0  / 10     ASSESSMENT    Assessment/Changes in Function:     Pt progressing well towards established goals. R shoulder AROM improving steadily and pt reports inc ease with ADLs and overhead tasks. []  See Progress Note/Recertification   Patient will continue to benefit from skilled PT services to modify and progress therapeutic interventions, address functional mobility deficits, address ROM deficits, address strength deficits, analyze and address soft tissue restrictions, analyze and cue movement patterns, analyze and modify body mechanics/ergonomics, assess and modify postural abnormalities, address imbalance/dizziness and instruct in home and community integration to attain remaining goals.      Progress toward goals / Updated goals:    Overhead reach LTGs addressed with progression of therex       PLAN    [x]  Upgrade activities as tolerated YES Continue plan of care   []  Discharge due to :    []  Other:      Therapist: Eileen Brian DPT    Date: 9/19/2017 Time: 8:58 AM     Future Appointments  Date Time Provider Adria Tipton   9/19/2017 9:30 AM [facility]   9/27/2017 8:30 AM [facility]   5/23/2018 8:45 AM Nicolas Mueller MD [address]

## 2017-09-27 ENCOUNTER — HOSPITAL ENCOUNTER (OUTPATIENT)
Dept: PHYSICAL THERAPY | Age: 72
Discharge: HOME | End: 2017-09-27
Payer: MEDICARE

## 2017-09-27 PROCEDURE — 97530 THERAPEUTIC ACTIVITIES: CPT

## 2017-09-27 PROCEDURE — G8985 CARRY GOAL STATUS: HCPCS

## 2017-09-27 PROCEDURE — 97110 THERAPEUTIC EXERCISES: CPT

## 2017-09-27 PROCEDURE — G8984 CARRY CURRENT STATUS: HCPCS

## 2017-09-27 NOTE — PROGRESS NOTES
PHYSICAL THERAPY - DAILY TREATMENT NOTE      Patient Name: Sandi Flannery        Date: 2017  : 1945   YES Patient  Verified  Visit #:   34   of   32-36  Insurance: Payor: Milly Patel / Plan: [place] MEDICARE PART A & B / Product Type: Medicare /      In time: 8:30 Out time: 9:30   Total Treatment Time: 60 min     Medicare Time Tracking (below)   Total Timed Codes (min):  50 1:1 Treatment Time:  40     TREATMENT AREA =  Right shoulder pain [M25.511]    SUBJECTIVE    Pain Level (on 0 to 10 scale):  0  / 10   Medication Changes/New allergies or changes in medical history, any new surgeries or procedures? NO    If yes, update Summary List   Subjective Functional Status/Changes:  []  No changes reported     \"It's doing well but I want to continue. \"        OBJECTIVE    Modalities Rationale:        min [] Estim, type/location:                                      []  att     []  unatt     []  w/US     []  w/ice    []  w/heat    min []  Mechanical Traction: type/lbs                   []  pro   []  sup   []  int   []  cont    []  before manual    []  after manual    min []  Ultrasound, settings/location:      min []  Iontophoresis w/ dexamethasone, location:                                               []  take home patch       []  in clinic   10 min [x]  Ice     []  Heat    location/position: Supine to R shoulder    min []  Vasopneumatic Device, press/temp:     min []  Other:    [x] Skin assessment post-treatment (if applicable):    [x]  intact    []  redness- no adverse reaction     []redness - adverse reaction:      40 (30) min Therapeutic Exercise:  [x]  See flow sheet   Rationale:      increase ROM, increase strength, improve coordination, improve balance and increase proprioception to improve the patients ability to perform ADLs and overhead activities with increased ease      10 (10) min Therapeutic Activity: FOTO, Re-evaluation   Rationale:  To assess current functional limitations and review POC    1 min Patient Education:  YES  Reviewed HEP   []  Progressed/Changed HEP based on: Other Objective/Functional Measures:    See PN     Post Treatment Pain Level (on 0 to 10) scale:   0  / 10     ASSESSMENT    Assessment/Changes in Function:     See PN     []  See Progress Note/Recertification   Patient will continue to benefit from skilled PT services to modify and progress therapeutic interventions, address functional mobility deficits, address ROM deficits, address strength deficits, analyze and address soft tissue restrictions, analyze and cue movement patterns, analyze and modify body mechanics/ergonomics, assess and modify postural abnormalities and instruct in home and community integration to attain remaining goals.      Progress toward goals / Updated goals:    See PN     PLAN    [x]  Upgrade activities as tolerated YES Continue plan of care   []  Discharge due to :    []  Other:      Therapist: Reta Sanchez DPT    Date: 9/27/2017 Time: 7:49 AM     Future Appointments  Date Time Provider Adria Tipton   9/27/2017 8:30 AM [facility] AT [facility]   5/23/2018 8:45 AM Ruma Tineo MD [address]

## 2017-09-27 NOTE — PROGRESS NOTES
[facility] PHYSICAL THERAPY  [address] [place] [address] [place], [address] - Phone: [phone]  Fax: [phone]  PROGRESS NOTE  Patient Name: Trini Sheppard :    Treatment/Medical Diagnosis: Right shoulder pain [M25.511]   Referral Source: Sridevi Coley MD     Date of Initial Visit: 17 Attended Visits: 29 Missed Visits: 2     SUMMARY OF TREATMENT  Patient's POC has consisted of active warm-up on UBE, R shoulder AAROM/AROM interventions, sub-max RTC strengthening,  manual therapy to address soft tissue and joint restrictions, and instruction in comprehensive HEP focused on regaining full [facility] ROM. Key Functional Changes/Progress  Patient demonstrates modest objective improvements in R shoulder flexion and scaption AROM. RTC is 4+/5 and patient reports minimal difficulty with ADLs and no issues with self care duties. Patient has been a pleasure to work with during out [address] [place] [address] and is agreeable to continue PROM/AROM progression x 2-3 weeks.       R Shoulder ROM:                                                     AROM                                                               PROM     Right    Right   Flexion  130 Flexion 145   Scaption/ Scaption/   ER @ 0 Degrees 75 ER @ 45 Degrees 85   IR HBB (cm from C7) L5 IR @ 45 degrees NT   ER @ 90 Degrees 64 ER @ 90 Degrees 85          Goal/Measure of Progress Goal Met? New Goals to be achieved in __4__  weeks:   1. Patient will demonstrate independence with HEP for symptom management at home. 2. Patient will improve R shoulder flexion AROM to 135 degrees with minimal UT compensation in order to improve ease with ADLs. 3. Patient will improve R shoulder abduction PROM to 140 degrees in order to improve ease with overhead duties  4. Patient will improve R shoulder scaption AROM to 125 degrees in order to improve ease with ADLs  5.  Patient will improve Quick DASH to < 19 in order to demo decreased disability of R shoulder           Progressing towards all LTGs since previous re-evaluation     New Goals to be achieved in __3__  weeks:  1. Patient will demonstrate independence with HEP for symptom management at home. 2.  Patient will improve R shoulder abduction PROM to 140 degrees in order to improve ease with overhead duties  3. Patient will improve R shoulder scaption AROM to 125 degrees in order to improve ease with ADLs  Frequency / Duration:   Patient to be seen  2  times per week for 2-3  weeks:    G-Codes: Carry  Current  CJ= 20-39%    Goal  CI= 1-19%. The severity rating is based on the Quick DASH     RECOMMENDATIONS  Continue and progress functional therex/therapeutic activity as able, utilizing manual therapy and modalities prn. Progress patient towards independent HEP to facilitate self-management of symptoms and progress gains after PT. If you have any questions/comments please contact us directly at [phone]. Thank you for allowing us to assist in the care of your patient. Therapist Signature: Florinda Peacock DPT Date: 2/87/4856   Certification Period: 5/24/17 - 10/23/17     Reporting Period 8/25/17 - 9/27/17   Time: 7:55 AM   NOTE TO PHYSICIAN:  PLEASE COMPLETE THE ORDERS BELOW AND FAX TO   [facility]: [phone]. If you are unable to process this request in 24 hours please contact our office: [phone].    ___ I have read the above report and request that my patient continue as recommended.   ___ I have read the above report and request that my patient continue therapy with the following changes/special instructions:_________________________________________________________   ___ I have read the above report and request that my patient be discharged from therapy.      Physician Signature:        Date:       Time:

## 2017-10-02 ENCOUNTER — HOSPITAL ENCOUNTER (OUTPATIENT)
Dept: PHYSICAL THERAPY | Age: 72
Discharge: HOME | End: 2017-10-02
Payer: MEDICARE

## 2017-10-02 PROCEDURE — 97110 THERAPEUTIC EXERCISES: CPT

## 2017-10-02 NOTE — PROGRESS NOTES
PHYSICAL THERAPY - DAILY TREATMENT NOTE    Patient Name: Jose Townsend        Date: 10/2/2017  : 1945   yes Patient  Verified  Visit #:      36  Insurance: Payor: Yoli Ice / Plan: [place] MEDICARE PART A & B / Product Type: Medicare /      In time: 300 Out time: 354   Total Treatment Time: 54     Medicare Time Tracking (below)   Total Timed Codes (min): 54 1:1 Treatment Time:  15     TREATMENT AREA =  Right shoulder pain [M25.511]    SUBJECTIVE  Pain Level (on 0 to 10 scale): 0/ 10   Medication Changes/New allergies or changes in medical history, any new surgeries or procedures?    no  If yes, update Summary List   Subjective Functional Status/Changes:  []  No changes reported     \"I been working this stuff at home.  I want to get tired out so I sleep better\"       OBJECTIVE       54/15 min Therapeutic Exercise:  [x]  See flow sheet   Rationale:      increase ROM and increase strength to improve the patients ability to  maintain ROM to facilitate ADLs per protocol           min Patient Education:  yes  Reviewed HEP   []  Progressed/Changed HEP based on: Pt ed on importance and benefits of compliance with HEP, core strength/stability and proper posture; pt verbalized understanding        Other Objective/Functional Measures:    VCs + demo to perform proper technique for TE  R SH Scap AROM=135 degs; ABD=127 degs    CGA for 90/90 IR/ER with YTB for proper tech to promote 90 degs of Abd     Post Treatment Pain Level (on 0 to 10) scale:   0  / 10     ASSESSMENT  Assessment/Changes in Function:   Progressed there-ex without c/o increase p!  increased SH abd and scap from 130> 135 degs; and 115> 127 degs   []  See Progress Note/Recertification   Patient will continue to benefit from skilled PT services to modify and progress therapeutic interventions, address functional mobility deficits, address ROM deficits, address strength deficits, analyze and address soft tissue restrictions, analyze and cue movement patterns, analyze and modify body mechanics/ergonomics, assess and modify postural abnormalities and instruct in home and community integration to attain remaining goals. Progress toward goals / Updated goals:    New Goals to be achieved in __3__  weeks:  1. Patient will demonstrate independence with HEP for symptom management at home. 2.  Patient will improve R shoulder abduction PROM to 140 degrees in order to improve ease with overhead duties. progressing; 127  3. Patient will improve R shoulder scaption AROM to 125 degrees in order to improve ease with ADLs.  MET-135 degs       PLAN  []  Upgrade activities as tolerated yes Continue plan of care   []  Discharge due to :    []  Other:      Therapist: Renea Angeles PTA    Date: 10/2/2017 Time: 3:15 PM     Future Appointments  Date Time Provider Adria Tipton   10/5/2017 8:30 AM [facility]   10/9/2017 1:30 PM Nacho Coe [facility]   10/13/2017 8:00 AM [facility]   5/23/2018 8:45 AM Leanna Mendoza MD [facility]

## 2017-10-05 ENCOUNTER — HOSPITAL ENCOUNTER (OUTPATIENT)
Dept: PHYSICAL THERAPY | Age: 72
Discharge: HOME | End: 2017-10-05
Payer: MEDICARE

## 2017-10-05 PROCEDURE — 97110 THERAPEUTIC EXERCISES: CPT

## 2017-10-05 NOTE — PROGRESS NOTES
PHYSICAL THERAPY - DAILY TREATMENT NOTE    Patient Name: Vinh Galvan        Date: 10/5/2017  : 1945   yes Patient  Verified  Visit #:   32 of   39  Insurance: Payor: Oleksandr Connor / Plan: [place] MEDICARE PART A & B / Product Type: Medicare /      In time: 36 Out time: 916   Total Treatment Time: 56     Medicare Time Tracking (below)   Total Timed Codes (min): 56 1:1 Treatment Time:  56     TREATMENT AREA =  Right shoulder pain [M25.511]    SUBJECTIVE  Pain Level (on 0 to 10 scale): 0/ 10   Medication Changes/New allergies or changes in medical history, any new surgeries or procedures?    no  If yes, update Summary List   Subjective Functional Status/Changes:  []  No changes reported     \"I am still feeling more in the L arm. \"       OBJECTIVE       56 min Therapeutic Exercise:  [x]  See flow sheet   Rationale:      increase ROM and increase strength to improve the patients ability to  maintain ROM to facilitate ADLs per protocol           min Patient Education:  yes  Reviewed HEP   []  Progressed/Changed HEP based on: Pt ed on importance and benefits of compliance with HEP, core strength/stability and proper posture; pt verbalized understanding        Other Objective/Functional Measures:    VCs + demo to perform proper technique for TE  initiated SL Abd with 2#, and increased reps without c/o p!    R SH AROM flex=150 degs/abd/tzcp=275 degs     Post Treatment Pain Level (on 0 to 10) scale:   0  / 10     ASSESSMENT  Assessment/Changes in Function:   Progressed there-ex without c/o increase p!      []  See Progress Note/Recertification   Patient will continue to benefit from skilled PT services to modify and progress therapeutic interventions, address functional mobility deficits, address ROM deficits, address strength deficits, analyze and address soft tissue restrictions, analyze and cue movement patterns, analyze and modify body mechanics/ergonomics, assess and modify postural abnormalities and instruct in home and community integration to attain remaining goals. Progress toward goals / Updated goals:    New Goals to be achieved in __3__  weeks:  1. Patient will demonstrate independence with HEP for symptom management at home. 2.  Patient will improve R shoulder abduction PROM to 140 degrees in order to improve ease with overhead duties. progressing; 127  3. Patient will improve R shoulder scaption AROM to 125 degrees in order to improve ease with ADLs.  MET-140 degs       PLAN  []  Upgrade activities as tolerated yes Continue plan of care   []  Discharge due to :    []  Other:      Therapist: Faizan Koenig PTA    Date: 10/5/2017 Time: 7:48 AM     Future Appointments  Date Time Provider Adria Tipton   10/5/2017 8:30 AM Elizebeth Skiff [facility]   10/9/2017 1:30 [facility]   10/13/2017 8:00 AM Elizebeth Skiff [facility]   5/23/2018 8:45 AM Shelly Umana MD [address] [place] [address]

## 2017-10-09 ENCOUNTER — HOSPITAL ENCOUNTER (OUTPATIENT)
Dept: PHYSICAL THERAPY | Age: 72
Discharge: HOME | End: 2017-10-09
Payer: MEDICARE

## 2017-10-09 PROCEDURE — 97110 THERAPEUTIC EXERCISES: CPT

## 2017-10-09 PROCEDURE — 97140 MANUAL THERAPY 1/> REGIONS: CPT

## 2017-10-09 NOTE — PROGRESS NOTES
PHYSICAL THERAPY - DAILY TREATMENT NOTE      Patient Name: Vimal Leiva        Date: 10/9/2017  : 1945   YES Patient  Verified  Visit #:   28   of   39  Insurance: Payor: Lisa Linder / Plan: [place] MEDICARE PART A & B / Product Type: Medicare /      In time: 1:00 Out time: 2:10   Total Treatment Time: 70 min     Medicare Time Tracking (below)   Total Timed Codes (min):  60 1:1 Treatment Time:  40     TREATMENT AREA =  Right shoulder pain [M25.511]    SUBJECTIVE    Pain Level (on 0 to 10 scale):  0  / 10   Medication Changes/New allergies or changes in medical history, any new surgeries or procedures? NO    If yes, update Summary List   Subjective Functional Status/Changes:  []  No changes reported     \"The doctor wants me to come here for the rest of October. She said they could inject that muscle that is keeping me from reaching behind my back. \"        OBJECTIVE    Modalities Rationale:        min [] Estim, type/location:                                      []  att     []  unatt     []  w/US     []  w/ice    []  w/heat    min []  Mechanical Traction: type/lbs                   []  pro   []  sup   []  int   []  cont    []  before manual    []  after manual    min []  Ultrasound, settings/location:      min []  Iontophoresis w/ dexamethasone, location:                                               []  take home patch       []  in clinic   10 min [x]  Ice     []  Heat    location/position: Supine to R shoulder BLEs on wedge    min []  Vasopneumatic Device, press/temp:     min []  Other:    [x] Skin assessment post-treatment (if applicable):    [x]  intact    []  redness- no adverse reaction     []redness - adverse reaction:      50 (30) min Therapeutic Exercise:  [x]  See flow sheet   Rationale:      increase ROM, increase strength, improve coordination, improve balance and increase proprioception to improve the patients ability to perform ADLs and overhead activities with increased ease      10 (10) min Manual Therapy: Flexion and IR ROM, DTM to external rotators with active IR ROM   Rationale:      decrease pain, increase ROM, increase tissue extensibility, decrease trigger points and increase postural awareness to improve patient's ability to improve IR ROM for inc ease with ADLs    1 min Patient Education:  YES  Reviewed HEP   []  Progressed/Changed HEP based on: Other Objective/Functional Measures:    Progressed/added IR ROM therex per flowsheet  R IR behind back to L5. Pt reports feeling restriction in external rotators with passive IR. Post Treatment Pain Level (on 0 to 10) scale:   0  / 10     ASSESSMENT    Assessment/Changes in Function:     Pt to continue PT 2x/week for the rest of October to inc IR ROM. Provided HEP handout focused on IR.     []  See Progress Note/Recertification   Patient will continue to benefit from skilled PT services to modify and progress therapeutic interventions, address functional mobility deficits, address ROM deficits, address strength deficits, analyze and address soft tissue restrictions, analyze and cue movement patterns, analyze and modify body mechanics/ergonomics, assess and modify postural abnormalities and instruct in home and community integration to attain remaining goals. Progress toward goals / Updated goals: Addressed IR ROM restrictions today with manual interventions and added IR stretches and AAROM for HEP.      PLAN    [x]  Upgrade activities as tolerated YES Continue plan of care   []  Discharge due to :    []  Other:      Therapist: Mireya Villanueva DPT    Date: 10/9/2017 Time: 12:23 PM     Future Appointments  Date Time Provider Adria Tipton   10/9/2017 1:30 PM Shazia Crews [facility] AT [facility]   10/13/2017 8:00 AM Mikey [facility]   5/23/2018 8:45 AM Siria Quiles MD [facility]

## 2017-10-13 ENCOUNTER — HOSPITAL ENCOUNTER (OUTPATIENT)
Dept: PHYSICAL THERAPY | Age: 72
Discharge: HOME | End: 2017-10-13
Payer: MEDICARE

## 2017-10-13 PROCEDURE — 97110 THERAPEUTIC EXERCISES: CPT

## 2017-10-13 PROCEDURE — 97140 MANUAL THERAPY 1/> REGIONS: CPT

## 2017-10-13 NOTE — PROGRESS NOTES
PHYSICAL THERAPY - DAILY TREATMENT NOTE    Patient Name: John Tameka        Date: 10/13/2017  : 1945   yes Patient  Verified  Visit #:   35 of   39  Insurance: Payor: Shannan Plunkett / Plan: [place] MEDICARE PART A & B / Product Type: Medicare /      In time: 800 Out time: 915   Total Treatment Time: 53     Medicare Time Tracking (below)   Total Timed Codes (min): 53 1:1 Treatment Time:  23     TREATMENT AREA =  Right shoulder pain [M25.511]    SUBJECTIVE  Pain Level (on 0 to 10 scale): 1/ 10   Medication Changes/New allergies or changes in medical history, any new surgeries or procedures?    no  If yes, update Summary List   Subjective Functional Status/Changes:  []  No changes reported     \"I woke up with some soreness, nothing bad\"       OBJECTIVE       43/15 min Therapeutic Exercise:  [x]  See flow sheet   Rationale:      increase ROM and increase strength to improve the patients ability to  maintain ROM to facilitate ADLs per protocol        10 min Manual Therapy: STM/MFR to post cap/pecs/UT/scap border, scap framing, post cap str; Rep IR + hold, ART to supra with abd; post/inf glides to GHJ gradIII>IV   Rationale:      decrease pain, increase ROM, increase tissue extensibility, decrease trigger points and increase postural awareness to improve patient's ability to carry/lift/reach/perform ADLs with ease       min Patient Education:  yes  Reviewed HEP   []  Progressed/Changed HEP based on: Pt ed on importance and benefits of compliance with HEP, core strength/stability and proper posture; pt verbalized understanding        Other Objective/Functional Measures:    VCs + demo to perform proper technique for TE  initiated YTB with SH flex AROM; and increased reps without c/o p!  demos UT compensation with tband E/IR;  Improved with VCs  c/o L sh discomfort with LT wall slides after completing TE; subsided with rest     Post Treatment Pain Level (on 0 to 10) scale:   0  / 10 ASSESSMENT  Assessment/Changes in Function:   Progressed there-ex without c/o increase p! []  See Progress Note/Recertification   Patient will continue to benefit from skilled PT services to modify and progress therapeutic interventions, address functional mobility deficits, address ROM deficits, address strength deficits, analyze and address soft tissue restrictions, analyze and cue movement patterns, analyze and modify body mechanics/ergonomics, assess and modify postural abnormalities and instruct in home and community integration to attain remaining goals. Progress toward goals / Updated goals:    New Goals to be achieved in __3__  weeks:  1. Patient will demonstrate independence with HEP for symptom management at home. 2.  Patient will improve R shoulder abduction PROM to 140 degrees in order to improve ease with overhead duties. progressing; 127  3. Patient will improve R shoulder scaption AROM to 125 degrees in order to improve ease with ADLs.  MET-140 degs       PLAN  [x]  Upgrade activities as tolerated yes Continue plan of care   []  Discharge due to :    []  Other:      Therapist: Alesia Cerrato PTA    Date: 10/13/2017 Time: 8:27 AM     Future Appointments  Date Time Provider Adria Tipton   10/17/2017 8:00 AM Alesia Cerrato PTA [facility]   10/20/2017 2:00 PM [facility]   10/25/2017 1:00 PM [facility]   10/27/2017 2:30 PM [facility]   10/31/2017 1:00 PM Sandor Roy [facility]   5/23/2018 8:45 AM Brooklyn Doan MD [address] [facility]

## 2017-10-17 ENCOUNTER — HOSPITAL ENCOUNTER (OUTPATIENT)
Dept: PHYSICAL THERAPY | Age: 72
Discharge: HOME | End: 2017-10-17
Payer: MEDICARE

## 2017-10-17 PROCEDURE — 97140 MANUAL THERAPY 1/> REGIONS: CPT

## 2017-10-17 PROCEDURE — 97110 THERAPEUTIC EXERCISES: CPT

## 2017-10-17 NOTE — PROGRESS NOTES
PHYSICAL THERAPY - DAILY TREATMENT NOTE    Patient Name: Julissa Malagon        Date: 10/17/2017  : 1945   yes Patient  Verified  Visit #:   29 of   39  Insurance: Payor: Baljit Dear / Plan: [place] MED[place]ARE PART A & B / Product Type: Medicare /      In time: 800 Out time: 566   Total Treatment Time: 53     Medicare Time Tracking (below)   Total Timed Codes (min): 53 1:1 Treatment Time:  23     TREATMENT AREA =  Right shoulder pain [M25.511]    SUBJECTIVE  Pain Level (on 0 to 10 scale): 0/ 10   Medication Changes/New allergies or changes in medical history, any new surgeries or procedures?    no  If yes, update Summary List   Subjective Functional Status/Changes:  []  No changes reported     \"I wake up to go pee and then I can not make it back to sleep. so I am tired today.  I shake the shaving cream in the shower to practice my exercise\"        OBJECTIVE       43/13 min Therapeutic Exercise:  [x]  See flow sheet   Rationale:      increase ROM and increase strength to improve the patients ability to  maintain ROM to facilitate ADLs per protocol        10 min Manual Therapy: STM/MFR to post cap/pecs/UT/scap border, scap framing, post cap str; Rep IR + hold, ART to supra with abd; post/inf glides to GHJ gradIII>IV   Rationale:      decrease pain, increase ROM, increase tissue extensibility, decrease trigger points and increase postural awareness to improve patient's ability to carry/lift/reach/perform ADLs with ease       min Patient Education:  yes  Reviewed HEP   []  Progressed/Changed HEP based on: Pt ed on importance and benefits of compliance with HEP, core strength/stability and proper posture; pt verbalized understanding        Other Objective/Functional Measures:    VCs + demo to perform proper technique for TE  initiated 1/2 prone rows/ext;  and increased reps without c/o p!  demos sh flex/abd AROM without UT compensation  c/o fatigue after rx     Post Treatment Pain Level (on 0 to 10) scale:   0  / 10     ASSESSMENT  Assessment/Changes in Function:   Progressed there-ex without c/o increase p! []  See Progress Note/Recertification   Patient will continue to benefit from skilled PT services to modify and progress therapeutic interventions, address functional mobility deficits, address ROM deficits, address strength deficits, analyze and address soft tissue restrictions, analyze and cue movement patterns, analyze and modify body mechanics/ergonomics, assess and modify postural abnormalities and instruct in home and community integration to attain remaining goals. Progress toward goals / Updated goals:    New Goals to be achieved in __3__  weeks:  1. Patient will demonstrate independence with HEP for symptom management at home. 2.  Patient will improve R shoulder abduction PROM to 140 degrees in order to improve ease with overhead duties. progressing; 127  3. Patient will improve R shoulder scaption AROM to 125 degrees in order to improve ease with ADLs.  MET-140 degs       PLAN  [x]  Upgrade activities as tolerated yes Continue plan of care   []  Discharge due to :    []  Other:      Therapist: Marni Shaw PTA    Date: 10/17/2017 Time: 9:11 AM     Future Appointments  Date Time Provider Adria Tipton   10/20/2017 2:00 PM [facility]   10/25/2017 1:00 PM [facility]   10/27/2017 2:30 PM [facility]   10/31/2017 1:00 PM Radha [facility]   5/23/2018 8:45 AM Stacie Peña MD [address]

## 2017-10-20 ENCOUNTER — HOSPITAL ENCOUNTER (OUTPATIENT)
Dept: PHYSICAL THERAPY | Age: 72
Discharge: HOME | End: 2017-10-20
Payer: MEDICARE

## 2017-10-20 PROCEDURE — 97110 THERAPEUTIC EXERCISES: CPT

## 2017-10-20 PROCEDURE — 97140 MANUAL THERAPY 1/> REGIONS: CPT

## 2017-10-20 NOTE — PROGRESS NOTES
PHYSICAL THERAPY - DAILY TREATMENT NOTE    Patient Name: Tila Gill        Date: 10/20/2017  : 1945   yes Patient  Verified  Visit #:   28 of   39  Insurance: Payor: Sandra Bush / Plan: [place] MEDICARE PART A & B / Product Type: Medicare /      In time: 155 Out time: 300   Total Treatment Time: 65     Medicare Time Tracking (below)   Total Timed Codes (min): 65 1:1 Treatment Time: 50     TREATMENT AREA =  Right shoulder pain [M25.511]    SUBJECTIVE  Pain Level (on 0 to 10 scale): 0/ 10   Medication Changes/New allergies or changes in medical history, any new surgeries or procedures?    no  If yes, update Summary List   Subjective Functional Status/Changes:  []  No changes reported     \"Its feeling good.  I am ready for the torture\"        OBJECTIVE       55/40 min Therapeutic Exercise:  [x]  See flow sheet   Rationale:      increase ROM and increase strength to improve the patients ability to  maintain ROM to facilitate ADLs per protocol        10 min Manual Therapy: STM/MFR to post cap/pecs/UT/scap border, scap framing, post cap str; Rep IR + hold, ART to supra with abd; post/inf glides to GHJ gradIII>IV   Rationale:      decrease pain, increase ROM, increase tissue extensibility, decrease trigger points and increase postural awareness to improve patient's ability to carry/lift/reach/perform ADLs with ease       min Patient Education:  yes  Reviewed HEP   []  Progressed/Changed HEP based on: Pt ed on importance and benefits of compliance with HEP, core strength/stability and proper posture; pt verbalized understanding        Other Objective/Functional Measures:    VCs + demo to perform proper technique for TE  increased reps without c/o p!  demos good scap stability with 1/2 prone TE  continues to demo fwd head with [id number]; instructed to perform at wall      Post Treatment Pain Level (on 0 to 10) scale:   0  / 10     ASSESSMENT  Assessment/Changes in Function:   Progressed there-ex without c/o increase p! []  See Progress Note/Recertification   Patient will continue to benefit from skilled PT services to modify and progress therapeutic interventions, address functional mobility deficits, address ROM deficits, address strength deficits, analyze and address soft tissue restrictions, analyze and cue movement patterns, analyze and modify body mechanics/ergonomics, assess and modify postural abnormalities and instruct in home and community integration to attain remaining goals. Progress toward goals / Updated goals:    New Goals to be achieved in __3__  weeks:  1. Patient will demonstrate independence with HEP for symptom management at home. 2.  Patient will improve R shoulder abduction PROM to 140 degrees in order to improve ease with overhead duties. progressing; 127  3. Patient will improve R shoulder scaption AROM to 125 degrees in order to improve ease with ADLs.  MET-140 degs       PLAN  [x]  Upgrade activities as tolerated yes Continue plan of care   []  Discharge due to :    []  Other:      Therapist: Marybel Herrera PTA    Date: 10/20/2017 Time: 3:12 PM     Future Appointments  Date Time Provider Adria Tipton   10/25/2017 1:00 PM [facility]   10/27/2017 2:30 PM [facility]   10/31/2017 1:00 PM [facility]   5/23/2018 8:45 AM Fiordaliza Crawford MD [facility]

## 2017-10-25 ENCOUNTER — HOSPITAL ENCOUNTER (OUTPATIENT)
Dept: PHYSICAL THERAPY | Age: 72
Discharge: HOME | End: 2017-10-25
Payer: MEDICARE

## 2017-10-25 PROCEDURE — 97110 THERAPEUTIC EXERCISES: CPT

## 2017-10-25 PROCEDURE — 97140 MANUAL THERAPY 1/> REGIONS: CPT

## 2017-10-25 PROCEDURE — G8985 CARRY GOAL STATUS: HCPCS

## 2017-10-25 PROCEDURE — G8984 CARRY CURRENT STATUS: HCPCS

## 2017-10-25 NOTE — PROGRESS NOTES
[facility]  [address] [place] [address] Job Andrew, [address] [employer] - Phone: [phone]  Fax: [phone]  PROGRESS NOTE  Patient Name: Mary Ho :    Treatment/Medical Diagnosis: Right shoulder pain [M25.511]   Referral Source: Katherine Arana MD     Date of Initial Visit: 17 Attended Visits: 36 Missed Visits: 2     Procedure: S/P R RTC repair 17      SUMMARY OF TREATMENT  Patient's POC has consisted of active warm-up on UBE, R shoulder AAROM/AROM interventions, sub-max RTC strengthening,  manual therapy to address soft tissue and joint restrictions, and instruction in comprehensive HEP focused on regaining full [address] [place] [address] ROM. Treatment strategies used to address functional mobility deficits, ROM deficits, strength deficits, analyze and address soft tissue restrictions, analyze and cue movement patterns, analyze and modify body mechanics/ergonomics, assess and modify postural abnormalities and instruct in home and community integration. Key Functional Changes/Progress  Patient continues to demonstrates progress in shoulder flexion and scaption AROM and minimal improvements in IR ROM (see objective measures below). He continues to demonstrate compliance with prescribed HEP and is actively engaged in rehab process. Pt reports no R shoulder pain at this time with ADLs, self care duties and hobbies/recreational activities. R Shoulder ROM:                                                     AROM                                                               PROM     Right    Right   Flexion  140 Flexion 145   Scaption/ Scaption/   ER @ 0 Degrees 75 ER @ 45 Degrees 85   IR HBB (cm from C7) L5 IR @ 45 degrees NT   IR @ 90 Degrees 50 ER @ 90 Degrees 60            Goal/Measure of Progress Goal Met? 1. Patient will demonstrate independence with HEP for symptom management at home.   2.  Patient will improve R shoulder abduction PROM to 140 degrees in order to improve ease with overhead duties  3. Patient will improve R shoulder scaption AROM to 125 degrees in order to improve ease with ADLs     1. Compliant    2. MET    3. MET       New Goals to be achieved in __3-4__  weeks:  1. Patient will demonstrate independence with HEP for symptom management at home. 2. Patient will improve R GH IR AROM to level of L4 in order to increase ease with dressing and self care duties. Frequency / Duration:   Patient to be seen  2  times per week for 3-4  weeks:    G-Codes: Carry  Current  CI= 1-19%    Goal  CI= 1-19%. The severity rating is based on the FOTO Score     RECOMMENDATIONS  Continue to progress A/P ROM and RTC strength until D/C in 3-4 weeks    If you have any questions/comments please contact us directly at [phone]. Thank you for allowing us to assist in the care of your patient. Therapist Signature: Bhavesh Razo DPT Date: 60/63/1989   Certification Period: 5/24/17 - 11/30/17     Reporting Period 9/27/17 - 10/25/17   Time: 8:01 AM   NOTE TO PHYSICIAN:  PLEASE COMPLETE THE ORDERS BELOW AND FAX TO   [facility]: [phone]. If you are unable to process this request in 24 hours please contact our office: [phone].    ___ I have read the above report and request that my patient continue as recommended.   ___ I have read the above report and request that my patient continue therapy with the following changes/special instructions:_________________________________________________________   ___ I have read the above report and request that my patient be discharged from therapy.      Physician Signature:        Date:       Time:

## 2017-10-25 NOTE — PROGRESS NOTES
PHYSICAL THERAPY - DAILY TREATMENT NOTE      Patient Name: Julissa Malagon        Date: 10/25/2017  : 1945   YES Patient  Verified  Visit #:   39   of   40  Insurance: Payor: Baljit Dear / Plan: [place] MEDICARE PART A & B / Product Type: Medicare /      In time: 1:00 Out time: 1:52   Total Treatment Time: 52 min     Medicare Time Tracking (below)   Total Timed Codes (min):  42 1:1 Treatment Time:  42     TREATMENT AREA =  Right shoulder pain [M25.511]    SUBJECTIVE    Pain Level (on 0 to 10 scale):  0  / 10   Medication Changes/New allergies or changes in medical history, any new surgeries or procedures? NO    If yes, update Summary List   Subjective Functional Status/Changes:  []  No changes reported     \" I go see the doctor next week. \"        OBJECTIVE    Modalities Rationale:        min [] Estim, type/location:                                      []  att     []  unatt     []  w/US     []  w/ice    []  w/heat    min []  Mechanical Traction: type/lbs                   []  pro   []  sup   []  int   []  cont    []  before manual    []  after manual    min []  Ultrasound, settings/location:      min []  Iontophoresis w/ dexamethasone, location:                                               []  take home patch       []  in clinic   10 min [x]  Ice     []  Heat    location/position: R shoulder supine BLEs on wedge    min []  Vasopneumatic Device, press/temp:     min []  Other:    [x] Skin assessment post-treatment (if applicable):    [x]  intact    []  redness- no adverse reaction     []redness - adverse reaction:      32 (32) min Therapeutic Exercise:  [x]  See flow sheet   Rationale:      increase ROM, increase strength, improve coordination and increase proprioception to improve the patients ability to perform ADLs and overhead activities with increased ease      10 (10) min Manual Therapy: STM/MFR to post cap/pecs/UT/scap border, scap framing, post cap str; Rep IR + hold, ART to supra with abd; post/inf glides to GHJ gradIII>IV   Rationale:      decrease pain, increase ROM, increase tissue extensibility, decrease trigger points and increase postural awareness to improve patient's ability to perform ADLs and overhead activities with increased ease     1 min Patient Education:  YES  Reviewed HEP   []  Progressed/Changed HEP based on: Other Objective/Functional Measures:    See PN     Post Treatment Pain Level (on 0 to 10) scale:   0  / 10     ASSESSMENT    Assessment/Changes in Function:     See PN     []  See Progress Note/Recertification   Patient will continue to benefit from skilled PT services to modify and progress therapeutic interventions, address functional mobility deficits, address ROM deficits, address strength deficits, analyze and address soft tissue restrictions, analyze and cue movement patterns, analyze and modify body mechanics/ergonomics, assess and modify postural abnormalities and instruct in home and community integration to attain remaining goals.      Progress toward goals / Updated goals:    See PN note     PLAN    [x]  Upgrade activities as tolerated YES Continue plan of care   []  Discharge due to :    []  Other:      Therapist: Carson Newsome DPT    Date: 10/25/2017 Time: 7:59 AM     Future Appointments  Date Time Provider Adria Tipton   10/25/2017 1:00 PM [facility]   10/27/2017 2:30 PM [facility]   11/1/2017 9:00 AM [facility]   5/23/2018 8:45 AM Mabel Roth MD [facility]

## 2017-10-27 ENCOUNTER — HOSPITAL ENCOUNTER (OUTPATIENT)
Dept: PHYSICAL THERAPY | Age: 72
Discharge: HOME | End: 2017-10-27
Payer: MEDICARE

## 2017-10-27 PROCEDURE — 97110 THERAPEUTIC EXERCISES: CPT

## 2017-10-27 PROCEDURE — 97140 MANUAL THERAPY 1/> REGIONS: CPT

## 2017-10-27 NOTE — PROGRESS NOTES
PHYSICAL THERAPY - DAILY TREATMENT NOTE    Patient Name: Tila Gill        Date: 10/27/2017  : 1945   YES Patient  Verified  Visit #:   40   of   40  Insurance: Payor: Sandra Bush / Plan: VA MEDICARE PART A & B / Product Type: Medicare /      In time: 2:25 Out time: 3:25   Total Treatment Time: 60     Medicare Time Tracking (below)   Total Timed Codes (min):  60 1:1 Treatment Time:  53     TREATMENT AREA =  Right shoulder pain [M25.511]    SUBJECTIVE    Pain Level (on 0 to 10 scale):  0  / 10   Medication Changes/New allergies or changes in medical history, any new surgeries or procedures? NO     If yes, update Summary List   Subjective Functional Status/Changes:  []  No changes reported     \"I do my exercises still. They're challenging. I'm getting stronger. \"          OBJECTIVE    50 (45) min Therapeutic Exercise:  [x]  See flow sheet   Rationale:      increase ROM and increase strength to improve the patients ability to perform ADL's with improved periscapular stability. 10 min Manual Therapy: PROM flexion/abduction with contract-relax, STM pec/lat insertion   Rationale:      decrease pain, increase ROM and increase tissue extensibility to improve patient's ability to perform [place] ADL's with improved mobility. min Patient Education:  YES  Reviewed HEP   []  Progressed/Changed HEP based on:   Patient reports compliance     Other Objective/Functional Measures:    Increased resistance with SL exercises. Added posterior capsular stretch to address tightness with PROM. Pt can achieve 160 deg with [place] PROM, but 150 deg AROM.      Post Treatment Pain Level (on 0 to 10) scale:   0  / 10     ASSESSMENT    Assessment/Changes in Function:     Pt presents to PT progressing slowly and steadily in AROM     []  See Progress Note/Recertification   Patient will continue to benefit from skilled PT services to modify and progress therapeutic interventions, address functional mobility deficits, address ROM deficits, address strength deficits, analyze and address soft tissue restrictions, analyze and cue movement patterns, analyze and modify body mechanics/ergonomics and assess and modify postural abnormalities to attain remaining goals. Progress toward goals / Updated goals: Addressed AROM with PROM.      PLAN    [x]  Upgrade activities as tolerated YES Continue plan of care   []  Discharge due to :    []  Other:      Therapist: Nevaeh Chao    Date: 10/27/2017 Time: 4:15 PM     Future Appointments  Date Time Provider Adria Tipton   11/1/2017 9:00 AM [facility]   11/3/2017 1:30 PM [facility]   11/6/2017 2:30 PM [facility]   11/8/2017 9:00 AM [facility]   11/13/2017 9:00 AM [facility]   11/15/2017 9:00 AM [facility]   5/23/2018 8:45 AM Stacie Peña MD [address] [facility]

## 2017-10-31 ENCOUNTER — APPOINTMENT (OUTPATIENT)
Dept: PHYSICAL THERAPY | Age: 72
End: 2017-10-31
Payer: MEDICARE

## 2017-11-01 ENCOUNTER — HOSPITAL ENCOUNTER (OUTPATIENT)
Dept: PHYSICAL THERAPY | Age: 72
Discharge: HOME | End: 2017-11-01
Payer: MEDICARE

## 2017-11-01 PROCEDURE — 97140 MANUAL THERAPY 1/> REGIONS: CPT

## 2017-11-01 PROCEDURE — 97110 THERAPEUTIC EXERCISES: CPT

## 2017-11-01 NOTE — PROGRESS NOTES
PHYSICAL THERAPY - DAILY TREATMENT NOTE      Patient Name: Stephanie Caceres        Date: 2017  : 1945   YES Patient  Verified  Visit #:   45   of   40  Insurance: Payor: Geremias Us / Plan: VA MEDICARE PART A & B / Product Type: Medicare /      In time: 9:05 Out time: 10:10   Total Treatment Time: 65 min     Medicare Time Tracking (below)   Total Timed Codes (min):  55 1:1 Treatment Time:  55     TREATMENT AREA =  Right shoulder pain [M25.511]    SUBJECTIVE    Pain Level (on 0 to 10 scale):  0  / 10   Medication Changes/New allergies or changes in medical history, any new surgeries or procedures? NO    If yes, update Summary List   Subjective Functional Status/Changes:  []  No changes reported     \"I'm doing all those exercises at home. It's coming along. \"        OBJECTIVE    Modalities Rationale:   decrease pain to improve patient's ability to increase ease with ADLs and self care duties   min [] Estim, type/location:                                      []  att     []  unatt     []  w/US     []  w/ice    []  w/heat    min []  Mechanical Traction: type/lbs                   []  pro   []  sup   []  int   []  cont    []  before manual    []  after manual    min []  Ultrasound, settings/location:      min []  Iontophoresis w/ dexamethasone, location:                                               []  take home patch       []  in clinic   10 min [x]  Ice     []  Heat    location/position: R shoulder supine BLEs on wedge    min []  Vasopneumatic Device, press/temp:     min []  Other:    [x] Skin assessment post-treatment (if applicable):    [x]  intact    []  redness- no adverse reaction     []redness - adverse reaction:      47 (47) min Therapeutic Exercise:  [x]  See flow sheet   Rationale:      increase ROM, increase strength, improve coordination and increase proprioception to improve the patients ability to perform ADLs and overhead activities with increased ease      8 (8) min Manual Therapy: PROM IR in 90/90, [address] joint mobs in all planes   Rationale:      increase ROM to improve patient's ability to reach behind back for inc ease with self care/dressing. 1 min Patient Education:  YES  Reviewed HEP   []  Progressed/Changed HEP based on: Other Objective/Functional Measures:    Tactile cueing required to maintain 90/90 position during sleeper stretch  Added shoulder 3 way. Pt able to perform flexion and scaption with good mechanics however demo significant R shoulder hike      Post Treatment Pain Level (on 0 to 10) scale:   0  / 10     ASSESSMENT    Assessment/Changes in Function:      Patient tolerated deltoid and RTC strengthening progression well today with inc fatigue following. []  See Progress Note/Recertification   Patient will continue to benefit from skilled PT services to modify and progress therapeutic interventions, address functional mobility deficits, address ROM deficits, address strength deficits, analyze and address soft tissue restrictions, analyze and cue movement patterns, analyze and modify body mechanics/ergonomics, assess and modify postural abnormalities and instruct in home and community integration to attain remaining goals. Progress toward goals / Updated goals:    New Goals to be achieved in __3-4__  weeks:  1. Patient will demonstrate independence with HEP for symptom management at home. Pt reports compliance  2. Patient will improve R GH IR AROM to level of L4 in order to increase ease with dressing and self care duties.  Addressed with posterior capsule stretch, towel IR, pulley IR, sleeper stretch and manual PROM       PLAN    [x]  Upgrade activities as tolerated YES Continue plan of care   []  Discharge due to :    []  Other:      Therapist: Kandee Schwab, DPT    Date: 11/1/2017 Time: 9:35 AM     Future Appointments  Date Time Provider Adria Tipton   11/3/2017 1:30 PM [facility]   11/6/2017 2:30 PM Rachel Kulkarni [facility] 11/8/2017 9:00 AM [facility]   11/13/2017 9:00 AM [facility]   11/15/2017 9:00 AM [facility]   5/23/2018 8:45 AM Chad Anderson MD [address] [facility]

## 2017-11-03 ENCOUNTER — HOSPITAL ENCOUNTER (OUTPATIENT)
Dept: PHYSICAL THERAPY | Age: 72
Discharge: HOME | End: 2017-11-03
Payer: MEDICARE

## 2017-11-03 PROCEDURE — 97110 THERAPEUTIC EXERCISES: CPT

## 2017-11-03 PROCEDURE — 97140 MANUAL THERAPY 1/> REGIONS: CPT

## 2017-11-03 NOTE — PROGRESS NOTES
PHYSICAL THERAPY - DAILY TREATMENT NOTE    Patient Name: Teresa Belcher        Date: 11/3/2017  : 1945   yes Patient  Verified  Visit #:   44 of   40  Insurance: Payor: Marlin Jo / Plan: [place] MED[place]ARE PART A & B / Product Type: Medicare /      In time: 130 Out time: 235   Total Treatment Time: 65     Medicare Time Tracking (below)   Total Timed Codes (min): 55 1:1 Treatment Time:  23     TREATMENT AREA =  Right shoulder pain [M25.511]    SUBJECTIVE  Pain Level (on 0 to 10 scale): 0/ 10   Medication Changes/New allergies or changes in medical history, any new surgeries or procedures?    no  If yes, update Summary List   Subjective Functional Status/Changes:  []  No changes reported     \"I saw the PA and they said another 6 wks\"   See script to continue 2-3x wk x 6 wks     OBJECTIVE       45 min Therapeutic Exercise:  [x]  See flow sheet   Rationale:      increase ROM and increase strength to improve the patients ability to  maintain ROM to facilitate ADLs per protocol        10 min Manual Therapy: STM/MFR to post cap/pecs/UT/scap border, scap framing, post cap str; Rep IR + hold,; post/inf glides to J gradIII>IV  [email or web address] [email or web address])/flex/ext with elbow at quick reversals      Rationale:      decrease pain, increase ROM, increase tissue extensibility, decrease trigger points and increase postural awareness to improve patient's ability to carry/lift/reach/perform ADLs with ease       min Patient Education:  yes  Reviewed HEP   []  Progressed/Changed HEP based on: Pt ed on importance and benefits of compliance with HEP, core strength/stability and proper posture; pt verbalized understanding        Other Objective/Functional Measures:  pt with new script to continue 2-3x wk x 6 wks; discuss progressing well and may not need full 6 wks; pt verbalized understanding    VCs + demo to perform proper technique for TE  initiated ER/IR quick reversals at 90 degs of abd   Post Treatment Pain Level (on 0 to 10) scale:   0  / 10     ASSESSMENT  Assessment/Changes in Function:   Progressed there-ex without c/o increase p!  demos fatigue at rep 8 and demos significant UT compensation    []  See Progress Note/Recertification   Patient will continue to benefit from skilled PT services to modify and progress therapeutic interventions, address functional mobility deficits, address ROM deficits, address strength deficits, analyze and address soft tissue restrictions, analyze and cue movement patterns, analyze and modify body mechanics/ergonomics, assess and modify postural abnormalities and instruct in home and community integration to attain remaining goals. Progress toward goals / Updated goals:    New Goals to be achieved in __3__  weeks:  1. Patient will demonstrate independence with HEP for symptom management at home. 2.  Patient will improve R shoulder abduction PROM to 140 degrees in order to improve ease with overhead duties. progressing; 127  3. Patient will improve R shoulder scaption AROM to 125 degrees in order to improve ease with ADLs.  MET-140 degs       PLAN  [x]  Upgrade activities as tolerated yes Continue plan of care   []  Discharge due to :    []  Other:      Therapist: Hanna Green PTA    Date: 11/3/2017 Time: 2:10 PM     Future Appointments  Date Time Provider Adria Tipton   11/6/2017 2:30 PM [facility]   11/8/2017 9:00 AM [facility]   11/13/2017 9:00 AM [facility]   11/15/2017 9:00 AM [facility]   5/23/2018 8:45 AM Anjaan Childers MD [address] [facility]

## 2017-11-06 ENCOUNTER — HOSPITAL ENCOUNTER (OUTPATIENT)
Dept: PHYSICAL THERAPY | Age: 72
Discharge: HOME | End: 2017-11-06
Payer: MEDICARE

## 2017-11-06 PROCEDURE — 97110 THERAPEUTIC EXERCISES: CPT

## 2017-11-06 NOTE — PROGRESS NOTES
PHYSICAL THERAPY - DAILY TREATMENT NOTE      Patient Name: Ninoska Walls        Date: 2017  : 1945   YES Patient  Verified  Visit #:   36   of   40  Insurance: Payor: Ariadna Brink / Plan: [place] MEDICARE PART A & B / Product Type: Medicare /      In time: 2:08 Out time: 3:05   Total Treatment Time: 57 min     Medicare Time Tracking (below)   Total Timed Codes (min):  57 1:1 Treatment Time:  57     TREATMENT AREA =  Right shoulder pain [M25.511]    SUBJECTIVE    Pain Level (on 0 to 10 scale):  0  / 10   Medication Changes/New allergies or changes in medical history, any new surgeries or procedures? NO    If yes, update Summary List   Subjective Functional Status/Changes:  []  No changes reported     \"I think I've about reached where I'm going to get. \"        OBJECTIVE    57 (57) min Therapeutic Exercise:  [x]  See flow sheet   Rationale:      increase ROM, increase strength, improve coordination and increase proprioception to improve the patients ability to perform ADLs and overhead activities with increased ease      1 min Patient Education:  YES  Reviewed HEP   []  Progressed/Changed HEP based on: Other Objective/Functional Measures:    Progressed reps/sets of RTC and IR AAROM exercises  Tolerated therex progression without complaints  Continues to require intermittent verbal and tactile cueing for proper therex performance     Post Treatment Pain Level (on 0 to 10) scale:   0  / 10     ASSESSMENT    Assessment/Changes in Function:     Patient tolerated therex and IR AAROM progression without complaints.      []  See Progress Note/Recertification   Patient will continue to benefit from skilled PT services to modify and progress therapeutic interventions, address functional mobility deficits, address ROM deficits, address strength deficits, analyze and address soft tissue restrictions, analyze and cue movement patterns, analyze and modify body mechanics/ergonomics, assess and modify postural abnormalities and instruct in home and community integration to attain remaining goals. Progress toward goals / Updated goals:    New Goals to be achieved in __3__  weeks:  1. Patient will demonstrate independence with HEP for symptom management at home. 2.  Patient will improve R shoulder abduction PROM to 140 degrees in order to improve ease with overhead duties. progressing; 127  3.  Patient will improve R shoulder scaption AROM to 125 degrees in order to improve ease with ADLs.  MET-140 degs       PLAN    [x]  Upgrade activities as tolerated YES Continue plan of care   []  Discharge due to :    []  Other:      Therapist: Helene Teixeira DPT    Date: 11/6/2017 Time: 9:43 AM     Future Appointments  Date Time Provider Adria Tipton   11/6/2017 2:30 PM [facility]   11/8/2017 9:00 AM [facility]   11/13/2017 9:00 AM [facility]   11/15/2017 9:00 AM [facility]   5/23/2018 8:45 AM María Elena Xavier MD [address] [facility]

## 2017-11-08 ENCOUNTER — HOSPITAL ENCOUNTER (OUTPATIENT)
Dept: PHYSICAL THERAPY | Age: 72
Discharge: HOME | End: 2017-11-08
Payer: MEDICARE

## 2017-11-08 PROCEDURE — 97140 MANUAL THERAPY 1/> REGIONS: CPT

## 2017-11-08 PROCEDURE — 97110 THERAPEUTIC EXERCISES: CPT

## 2017-11-08 NOTE — PROGRESS NOTES
PHYSICAL THERAPY - DAILY TREATMENT NOTE      Patient Name: Queenie Cook        Date: 2017  : 1945   YES Patient  Verified  Visit #:   39   of   40  Insurance: Payor: Yoshi Drafts / Plan: [place] MEDICARE PART A & B / Product Type: Medicare /      In time: 9:05 Out time: 9:40   Total Treatment Time: 35 min     Medicare Time Tracking (below)   Total Timed Codes (min):  23 1:1 Treatment Time:  35     TREATMENT AREA =  Right shoulder pain [M25.511]    SUBJECTIVE    Pain Level (on 0 to 10 scale):  0  / 10   Medication Changes/New allergies or changes in medical history, any new surgeries or procedures? NO    If yes, update Summary List   Subjective Functional Status/Changes:  []  No changes reported     \"Its moving pretty well. \"        OBJECTIVE    25 (13) min Therapeutic Exercise:  [x]  See flow sheet   Rationale:      increase ROM, increase strength, improve coordination, improve balance and increase proprioception to improve the patients ability to perform ADLs and overhead activities with increased ease      10 (10) min Manual Therapy: PROM IR, flexion, scaption. Grade 3-4 GH post/inf mobilizations   Rationale:      decrease pain, increase ROM, increase tissue extensibility and increase postural awareness to improve patient's ability to perform ADLs and overhead activities with increased ease     1 min Patient Education:  YES  Reviewed HEP   []  Progressed/Changed HEP based on: Other Objective/Functional Measures:    Progressed reps/sets of UE therex  No c/o pain with therex progression     Post Treatment Pain Level (on 0 to 10) scale:   0  / 10     ASSESSMENT    Assessment/Changes in Function:     Pt tolerated session well without complaints.      []  See Progress Note/Recertification   Patient will continue to benefit from skilled PT services to modify and progress therapeutic interventions, address functional mobility deficits, address ROM deficits, address strength deficits, analyze and address soft tissue restrictions, analyze and cue movement patterns, analyze and modify body mechanics/ergonomics, assess and modify postural abnormalities, address imbalance/dizziness and instruct in home and community integration to attain remaining goals. Progress toward goals / Updated goals:    Progressing well towards LTGs     PLAN    [x]  Upgrade activities as tolerated YES Continue plan of care   []  Discharge due to :    []  Other:      Therapist: Greg Dumont DPT, Cert.  DN    Date: 11/8/2017 Time: 9:15 AM     Future Appointments  Date Time Provider Adria Tipton   11/13/2017 9:00 AM [facility]   11/15/2017 9:00 AM [facility]   11/21/2017 9:00 AM [facility]   11/22/2017 10:00 AM [facility]   11/27/2017 2:30 PM [facility]   11/29/2017 2:30 PM [facility]   5/23/2018 8:45 AM Jones Trejo MD [address] [facility]

## 2017-11-13 ENCOUNTER — HOSPITAL ENCOUNTER (OUTPATIENT)
Dept: PHYSICAL THERAPY | Age: 72
Discharge: HOME | End: 2017-11-13
Payer: MEDICARE

## 2017-11-13 PROCEDURE — 97140 MANUAL THERAPY 1/> REGIONS: CPT

## 2017-11-13 PROCEDURE — 97110 THERAPEUTIC EXERCISES: CPT

## 2017-11-13 NOTE — PROGRESS NOTES
PHYSICAL THERAPY - DAILY TREATMENT NOTE      Patient Name: Julissa Malagon        Date: 2017  : 1945   YES Patient  Verified  Visit #:   43   of   40  Insurance: Payor: Baljit Dear / Plan: [place] MEDICARE PART A & B / Product Type: Medicare /      In time: 9:05 Out time: 9:48   Total Treatment Time: 43     Medicare Time Tracking (below)   Total Timed Codes (min):  43 1:1 Treatment Time:  43     TREATMENT AREA =  Right shoulder pain [M25.511]    SUBJECTIVE    Pain Level (on 0 to 10 scale):  0  / 10   Medication Changes/New allergies or changes in medical history, any new surgeries or procedures? NO    If yes, update Summary List   Subjective Functional Status/Changes:  []  No changes reported     \"Its coming along. \"        OBJECTIVE    35 (35) min Therapeutic Exercise:  [x]  See flow sheet   Rationale:      increase ROM, increase strength, improve coordination and increase proprioception to improve the patients ability to perform ADLs and overhead activities with increased ease      8 (8) min Manual Therapy: PROM flexion/ER/IR/Abd/Scaption. Grade 3-4 GH joint mobs   Rationale:      decrease pain, increase ROM, increase tissue extensibility and increase postural awareness to improve patient's ability to perform ADLs and overhead activities with increased ease     1 min Patient Education:  YES  Reviewed HEP   []  Progressed/Changed HEP based on: Other Objective/Functional Measures:    Min VC required for proper therex performance  Progressed reps sets per flowsheet     Post Treatment Pain Level (on 0 to 10) scale:   0  / 10     ASSESSMENT    Assessment/Changes in Function:     Pt progressing well towards goals.      []  See Progress Note/Recertification   Patient will continue to benefit from skilled PT services to modify and progress therapeutic interventions, address functional mobility deficits, address ROM deficits, address strength deficits, analyze and address soft tissue restrictions, analyze and cue movement patterns, analyze and modify body mechanics/ergonomics, assess and modify postural abnormalities and instruct in home and community integration to attain remaining goals. Progress toward goals / Updated goals:    New Goals to be achieved in __3-4__  weeks:  1. Patient will demonstrate independence with HEP for symptom management at home. Reports compliance with therex  2. Patient will improve R GH IR AROM to level of L4 in order to increase ease with dressing and self care duties. Manual PROM       PLAN    [x]  Upgrade activities as tolerated YES Continue plan of care   []  Discharge due to :    []  Other:      Therapist: Xin Carmichael DPT, Cert.  DN    Date: 11/13/2017 Time: 7:54 AM     Future Appointments  Date Time Provider Adria Tipton   11/13/2017 9:00 AM [facility]   11/15/2017 9:00 AM [facility]   11/21/2017 9:00 AM [facility]   11/22/2017 10:00 AM [facility]   11/27/2017 2:30 PM [facility]   11/29/2017 2:30 PM [facility]   5/23/2018 8:45 AM Gerald Hoyt MD [address] [facility]

## 2017-11-15 ENCOUNTER — HOSPITAL ENCOUNTER (OUTPATIENT)
Dept: PHYSICAL THERAPY | Age: 72
Discharge: HOME | End: 2017-11-15
Payer: MEDICARE

## 2017-11-15 PROCEDURE — 97110 THERAPEUTIC EXERCISES: CPT

## 2017-11-15 PROCEDURE — 97140 MANUAL THERAPY 1/> REGIONS: CPT

## 2017-11-15 NOTE — PROGRESS NOTES
PHYSICAL THERAPY - DAILY TREATMENT NOTE      Patient Name: Teo Serrano        Date: 11/15/2017  : 1945   YES Patient  Verified  Visit #:   37   of   40  Insurance: Payor: Villa Signs / Plan: [place] MEDICARE PART A & B / Product Type: Medicare /      In time: 8:55 Out time: 9:35   Total Treatment Time: 40 min     Medicare Time Tracking (below)   Total Timed Codes (min):  40 1:1 Treatment Time:  40     TREATMENT AREA =  Right shoulder pain [M25.511]    SUBJECTIVE    Pain Level (on 0 to 10 scale):  0  / 10   Medication Changes/New allergies or changes in medical history, any new surgeries or procedures? NO    If yes, update Summary List   Subjective Functional Status/Changes:  []  No changes reported      \"Its coming along, no pain. \"        OBJECTIVE    30 (30) min Therapeutic Exercise:  [x]  See flow sheet   Rationale:      increase ROM, increase strength, improve coordination and increase proprioception to improve the patients ability to perform ADLs and overhead activities with increased ease      10 (10) min Manual Therapy: Grade 3-4 GH joint mobs, PROM flexion/scaption/abd/IR   Rationale:      increase ROM to improve patient's ability to perform ADLs and overhead activities with increased ease     1 min Patient Education:  YES  Reviewed HEP   []  Progressed/Changed HEP based on:         Other Objective/Functional Measures:    Min VC required for proper therex performance  Progressed reps sets per flowsheet     Post Treatment Pain Level (on 0 to 10) scale:   0  / 10     ASSESSMENT    Assessment/Changes in Function:     Pt progressing well towards stated goals     []  See Progress Note/Recertification   Patient will continue to benefit from skilled PT services to modify and progress therapeutic interventions, address functional mobility deficits, address ROM deficits, address strength deficits, analyze and address soft tissue restrictions, analyze and cue movement patterns, analyze and modify body mechanics/ergonomics, assess and modify postural abnormalities and instruct in home and community integration to attain remaining goals. Progress toward goals / Updated goals:    New Goals to be achieved in __3-4__  weeks:  1. Patient will demonstrate independence with HEP for symptom management at home. Reports compliance with therex  2. Patient will improve R GH IR AROM to level of L4 in order to increase ease with dressing and self care duties. Manual PROM       PLAN    [x]  Upgrade activities as tolerated YES Continue plan of care   []  Discharge due to :    []  Other:      Therapist: Serenity Garcia DPT, Cert.  DN    Date: 11/15/2017 Time: 7:58 AM     Future Appointments  Date Time Provider Adria Tipton   11/15/2017 9:00 AM [facility]   11/21/2017 9:00 AM [facility]   11/22/2017 10:00 AM [facility]   11/27/2017 2:30 PM [facility]   11/29/2017 2:30 PM [facility]   5/23/2018 8:45 AM Jimmie Chacon MD [place] [id number]

## 2017-11-21 ENCOUNTER — HOSPITAL ENCOUNTER (OUTPATIENT)
Dept: PHYSICAL THERAPY | Age: 72
Discharge: HOME | End: 2017-11-21
Payer: MEDICARE

## 2017-11-21 PROCEDURE — 97140 MANUAL THERAPY 1/> REGIONS: CPT

## 2017-11-21 PROCEDURE — 97110 THERAPEUTIC EXERCISES: CPT

## 2017-11-21 NOTE — PROGRESS NOTES
PHYSICAL THERAPY - DAILY TREATMENT NOTE      Patient Name: Nata Richardson        Date: 2017  : 1945   YES Patient  Verified  Visit #:      55  Insurance: Payor: Francesco Many / Plan: [place] MEDICARE PART A & B / Product Type: Medicare /       In time: 8:55 Out time: 9:38   Total Treatment Time: 43 min     Medicare Time Tracking (below)   Total Timed Codes (min):  43 1:1 Treatment Time:  43     TREATMENT AREA =  Right shoulder pain [M25.511]    SUBJECTIVE    Pain Level (on 0 to 10 scale):  0  / 10   Medication Changes/New allergies or changes in medical history, any new surgeries or procedures? NO    If yes, update Summary List   Subjective Functional Status/Changes:  []  No changes reported     \"I know my exercises. \"        OBJECTIVE    35 (35) min Therapeutic Exercise:  [x]  See flow sheet   Rationale:      increase ROM, increase strength, improve coordination, improve balance and increase proprioception to improve the patients ability to perform ADLs and overhead activities with increased ease      8 (8) min Manual Therapy: PROM GH IR, flexion and scaption   Rationale:      decrease pain, increase ROM, increase tissue extensibility and increase postural awareness to improve patient's ability to perform ADLs and overhead activities with increased ease     1 min Patient Education:  YES  Reviewed HEP   []  Progressed/Changed HEP based on: Other Objective/Functional Measures:    Min VC required for proper therex performance  Progressed reps/sets of RTC and scapulothoracic therex per flowsheet     Post Treatment Pain Level (on 0 to 10) scale:   0  / 10     ASSESSMENT    Assessment/Changes in Function:     Progressing well, plan to D/C in 2 visits.      []  See Progress Note/Recertification   Patient will continue to benefit from skilled PT services to modify and progress therapeutic interventions, address functional mobility deficits, address ROM deficits, address strength deficits, analyze and address soft tissue restrictions, analyze and cue movement patterns, analyze and modify body mechanics/ergonomics, assess and modify postural abnormalities and instruct in home and community integration to attain remaining goals. Progress toward goals / Updated goals:    Cx 11/22; pt I with HEP. Will see pt 2x/week next week and D/C next Wednesday. PLAN    [x]  Upgrade activities as tolerated YES Continue plan of care   []  Discharge due to :    []  Other:      Therapist: Emile Rehman DPT, Cert.  DN    Date: 11/21/2017 Time: 9:03 AM     Future Appointments  Date Time Provider Adria Tipton   11/22/2017 10:00 AM [facility]   11/27/2017 2:30 PM [facility]   11/29/2017 2:30 PM [facility]   5/23/2018 8:45 AM Juanita Sanz MD [address] [facility]

## 2017-11-22 ENCOUNTER — APPOINTMENT (OUTPATIENT)
Dept: PHYSICAL THERAPY | Age: 72
End: 2017-11-22
Payer: MEDICARE

## 2017-11-27 ENCOUNTER — HOSPITAL ENCOUNTER (OUTPATIENT)
Dept: PHYSICAL THERAPY | Age: 72
Discharge: HOME | End: 2017-11-27
Payer: MEDICARE

## 2017-11-27 PROCEDURE — 97110 THERAPEUTIC EXERCISES: CPT

## 2017-11-27 PROCEDURE — 97140 MANUAL THERAPY 1/> REGIONS: CPT

## 2017-11-27 NOTE — PROGRESS NOTES
PHYSICAL THERAPY - DAILY TREATMENT NOTE      Patient Name: Jose Townsend        Date: 2017  : 1945   YES Patient  Verified  Visit #:   39   of   55  Insurance: Payor: Yoli Ice / Plan: [place] MEDICARE PART A & B / Product Type: Medicare /      In time: 2:30 Out time: 3:10   Total Treatment Time: 40 min     Medicare Time Tracking (below)   Total Timed Codes (min):  40 1:1 Treatment Time:  40     TREATMENT AREA =  Right shoulder pain [M25.511]    SUBJECTIVE    Pain Level (on 0 to 10 scale):  0  / 10   Medication Changes/New allergies or changes in medical history, any new surgeries or procedures? NO    If yes, update Summary List   Subjective Functional Status/Changes:  []  No changes reported     \"I'm doing well, no problems with the shoulder. \"        OBJECTIVE    32 (32) min Therapeutic Exercise:  [x]  See flow sheet   Rationale:      increase ROM, increase strength, improve coordination and increase proprioception to improve the patients ability to perform ADLs and overhead activities with increased ease      8 (8) min Manual Therapy: PROM IR/Flexion, Grade 4 GH joint mobs   Rationale:      increase ROM to improve patient's ability to perform self care duties with increased ease    1 min Patient Education:  YES  Reviewed HEP   []  Progressed/Changed HEP based on: Other Objective/Functional Measures:    Min VC required for proper therex performance  Progressed reps/sets per flowsheet     Post Treatment Pain Level (on 0 to 10) scale:   0  / 10     ASSESSMENT    Assessment/Changes in Function:     Patient without c/o of pain or limited function.  D/C NV.     []  See Progress Note/Recertification   Patient will continue to benefit from skilled PT services to modify and progress therapeutic interventions, address functional mobility deficits, address ROM deficits, address strength deficits, analyze and address soft tissue restrictions, analyze and cue movement patterns, analyze and modify body mechanics/ergonomics, assess and modify postural abnormalities and instruct in home and community integration to attain remaining goals. Progress toward goals / Updated goals: All LTGs MET; D/C NV     PLAN    [x]  Upgrade activities as tolerated YES Continue plan of care   []  Discharge due to :    []  Other:      Therapist: Vero Christine DPT, Cert.  DN    Date: 11/27/2017 Time: 8:15 AM     Future Appointments  Date Time Provider Adria Tipton   11/27/2017 2:30 PM [facility]   11/29/2017 2:30 PM [facility]   5/23/2018 8:45 AM Denisha Escalante MD [address] [facility]

## 2017-11-29 ENCOUNTER — HOSPITAL ENCOUNTER (OUTPATIENT)
Dept: PHYSICAL THERAPY | Age: 72
Discharge: HOME | End: 2017-11-29
Payer: MEDICARE

## 2017-11-29 PROCEDURE — 97530 THERAPEUTIC ACTIVITIES: CPT

## 2017-11-29 PROCEDURE — G8986 CARRY D/C STATUS: HCPCS

## 2017-11-29 PROCEDURE — G8985 CARRY GOAL STATUS: HCPCS

## 2017-11-29 NOTE — PROGRESS NOTES
PHYSICAL THERAPY - DAILY TREATMENT NOTE       Patient Name: Sandi Flannery        Date: 2017  : 1945   YES Patient  Verified  Visit #:   55   of   55  Insurance: Payor: Milly Patel / Plan: [place] MEDICARE PART A & B / Product Type: Medicare /      In time: 2:25 Out time: 2:35   Total Treatment Time: 10     Medicare Time Tracking (below)   Total Timed Codes (min):  10 1:1 Treatment Time:  10     TREATMENT AREA =  Right shoulder pain [M25.511]    SUBJECTIVE    Pain Level (on 0 to 10 scale):  0  / 10   Medication Changes/New allergies or changes in medical history, any new surgeries or procedures? NO    If yes, update Summary List   Subjective Functional Status/Changes:  []  No changes reported     \"Yea, I think I've got about as much as I'm going to get. \"        OBJECTIVE    10 (10) min Therapeutic Activity: FOTO, Re-assessment   Rationale: To assess current functional limitations and review POC    1 min Patient Education:  YES  Reviewed HEP   []  Progressed/Changed HEP based on: Other Objective/Functional Measures:    R Shoulder ROM:                                                     AROM                                                               PROM     Right    Right   Flexion  150 Flexion 150   Scaption/ Scaption/   ER @ 0 Degrees 75 ER @ 45 Degrees 85   IR HBB (cm from C7) L5 IR @ 45 degrees NT   IR @ 90 Degrees 50 ER @ 90 Degrees 60        Post Treatment Pain Level (on 0 to 10) scale:   0  / 10     ASSESSMENT    Assessment/Changes in Function:     See D/C note     []  See Progress Note/Recertification        Progress toward goals / Updated goals:    1. Patient will demonstrate independence with HEP for symptom management at home. MET  2. Patient will improve R GH IR AROM to level of L4 in order to increase ease with dressing and self care duties.  Not met; no progress       PLAN    []  Upgrade activities as tolerated NO Continue plan of care   [x]  Discharge due to : D/C with HEP. Plateau in progress   []  Other:      Therapist: Bhavesh Razo DPT, Cert.  DN    Date: 11/29/2017 Time: 9:29 AM     Future Appointments  Date Time Provider Adria Tipton   11/29/2017 2:30 PM [facility]   5/23/2018 8:45 AM Stacie Peña MD [address] [facility]

## 2017-12-01 NOTE — PROGRESS NOTES
[facility] PHYSICAL THERAPY  [address] [place] [address] Edward Linares, [address] [facility] [address] - Phone: [phone]  Fax: [phone] SUMMARY  Patient Name: Teagan Aguilera :    Treatment/Medical Diagnosis: Right shoulder pain [M25.511]   Referral Source: Terrell Higginbotham MD     Date of Initial Visit: 17 Attended Visits: 46 Missed Visits: 2     Procedure: S/P R RTC repair 17      SUMMARY OF TREATMENT  Patient's POC has consisted of active warm-up on UBE, R shoulder AAROM/AROM interventions, sub-max RTC strengthening,  manual therapy to address soft tissue and joint restrictions, and instruction in comprehensive HEP focused on regaining full [facility] ROM. Treatment strategies used to address functional mobility deficits, ROM deficits, strength deficits, analyze and address soft tissue restrictions, analyze and cue movement patterns, analyze and modify body mechanics/ergonomics, assess and modify postural abnormalities and instruct in home and community integration. CURRENT STATUS  Patient  demonstrates plateau in progress at this time. ROM measurement are consistent with previous re-evaluation 10/25/17. He continues to demonstrate compliance with prescribed HEP and is actively engaged in rehab process. Pt reports no R shoulder pain at this time with ADLs, self care duties and hobbies/recreational activities. He will be D/C from PT at this time.     R Shoulder ROM:                                                     AROM                                                               PROM     Right    Right   Flexion  140 Flexion 145   Scaption/ Scaption/   ER @ 0 Degrees 75 ER @ 45 Degrees 85   IR HBB (cm from C7) L5 IR @ 45 degrees NT   IR @ 90 Degrees 50 ER @ 90 Degrees 60          Goal/Measure of Progress Goal Met?    1. Patient will demonstrate independence with HEP for symptom management at home.   2. Patient will improve R GH IR AROM to level of L4 in order to increase ease with dressing and self care duties. 1. MET    2. Not Met        G-Codes: Carry   Goal  CI= 1-19%   D/C  CI= 1-19%. The severity rating is based on the FOTO Score     RECOMMENDATIONS  Discontinue therapy. Progressing towards or have reached established goals. If you have any questions/comments please contact us directly at [phone]. Thank you for allowing us to assist in the care of your patient. Therapist Signature: Clemencia Cochran DPT, Cert. DN Date: 11/29/17   Reporting Period: 10/25/17 - 11/29/17 Time: 11:34 AM     ===========================================================================================  I certify that the above Physical Therapy Services are being furnished while the patient is under my care. I agree with the treatment plan and certify that this therapy is necessary. Physician Signature:        Date:       Time:     Please sign and return to [employer] or you may fax the signed copy to [phone]. Thank you.

## 2018-03-21 ENCOUNTER — HOSPITAL ENCOUNTER (OUTPATIENT)
Dept: PHYSICAL THERAPY | Age: 73
Discharge: HOME | End: 2018-03-21
Payer: MEDICARE

## 2018-03-21 PROCEDURE — G8984 CARRY CURRENT STATUS: HCPCS

## 2018-03-21 PROCEDURE — G8985 CARRY GOAL STATUS: HCPCS

## 2018-03-21 PROCEDURE — 97161 PT EVAL LOW COMPLEX 20 MIN: CPT

## 2018-03-21 NOTE — PROGRESS NOTES
[facility] PHYSICAL THERAPY  [address] [place] [address] Jenny Andrew, [address] - Phone: [phone]  Fax: [phone] / [facility]  Patient Name: Isa Ruano : 1945   Medical   Diagnosis: Pain in right shoulder [M25.511] Treatment Diagnosis: R shoulder bursitis   Onset Date: 2 weeks ago AGE: 67 y.o. Referral Source: Ryan [facility]): 3/21/2018   Prior Hospitalization: See medical history Provider #: [id number]   Prior Level of Function: Prior R RTC repair 17   Comorbidities: arthritis; HTN; asthma; Hx CABG; R knee replacement; R RCR; Hx prostate resection; allergies   Medications: Verified on Patient Summary List   The Plan of Care and following information is based on the information from the initial evaluation.   =======================================================================================  Assessment / key information:  Patient is a 67 y.o. male who presents with chief complaint of right shoulder pain [M25.511]. Patient underwent extensive PT in our facility May - 2017 s/p R open subscap repair, DOS 17. Patient returns for PT evaluation with reports of increased right shoulder pain during reaching. S/s consistent with possible R shoulder bursitis/deltoid bursitis. Examination and objective measures taken today not indicative of adhesive capsulitis. Patient demonstrates slight decline in ROM measurements since D/C from previous PT treatment. Will place patient on hold x 2 weeks and f/u with POC. Reviewed current exercises routine patient performs daily and modified to reduce pain eliciting patterns. Patient would benefit from skilled PT services to address these issues to restore previously attained shoulder AROM, decrease pain and improve ability to reach and perform overhead activities.   Thank you for this referral       Objective Data:    ROM:    Shoulder                  AROM                                               PROM    Right   Right   Flexion 130 std, 140 supine Flexion 150   Extension 55 Extension NT   Scaption/ Scaption/   ER @ 0 Degrees NT ER @ 0 Degrees NT   ER @ 90 Degrees 75 ER @ 90 Degrees 80   IR @ 90 Degrees 45 IR @ 90 Degrees 45   IR HBB 25% decr       Functional ER R PSIS             R Shoulder ROM: At D/C 12/1/2017:                                                       AROM                                                               PROM     Right    Right   Flexion  140 Flexion 145   Scaption/ Scaption/   ER @ 0 Degrees 75 ER @ 45 Degrees 85   IR HBB (cm from C7) L5 IR @ 45 degrees NT   IR @ 90 Degrees 50 ER @ 90 Degrees 60       ======================================================================================  Eval Complexity: History: HIGH Complexity :3+ comorbidities / personal factors will impact the outcome/ POC Exam:MEDIUM Complexity : 3 Standardized tests and measures addressing body structure, function, activity limitation and / or participation in recreation  Presentation: LOW Complexity : Stable, uncomplicated  Clinical Decision Making:MEDIUM Complexity : FOTO score of 26-74Overall Complexity:LOW   Problem List: pain affecting function, decrease ROM, decrease strength, edema affecting function, impaired gait/ balance, decrease ADL/ functional abilitiies, decrease activity tolerance, decrease flexibility/ joint mobility and decrease transfer abilities   Treatment Plan may include any combination of the following: Therapeutic exercise, Therapeutic activities, Neuromuscular re-education, Physical agent/modality, Gait/balance training, Manual therapy, Aquatic therapy, Patient education, Self Care training, Functional mobility training, Home safety training and Stair training  Patient / Family readiness to learn indicated by: asking questions, trying to perform skills and interest  Persons(s) to be included in education: patient (P)  Barriers to Learning/Limitations: None  Measures taken:    Patient Goal (s): \"get rid of pain. \"   Patient self reported health status: good  Rehabilitation Potential: good   Short Term Goals: To be accomplished in  4  Visits:  1. Patient will be compliant with HEP in order to facilitate progression of therapeutic exercise and improve mobility  2. Patient will report at least 25% improvement in current symptoms to improve ease with reaching/lifting     Long Term Goals: To be accomplished in  8  Visitis  1. Patient will be independent with HEP in order to demonstrate ability to perform therapeutic exercises and continue progressing functional mobility upon D/C  2. Patient will restore right shoulder flexion AROM to 140 degrees without pain to improve ease with ADLs and self care tasks  3. Patient will improve FOTO score to 73% to demonstrate a meaningful improvement in functional mobility and increased quality of life      Frequency / Duration:   Patient to be seen  2  times per week for 4-6  weeks:  Patient / Caregiver education and instruction: self care, activity modification and exercises  G-Codes (GP): Carry [id number] Current  CJ= 20-39%    Goal  CJ= 20-39%. The severity rating is based on the FOTO Score  Therapist Signature: Gretchen Tucker DPT Date: 9/74/4808   Certification Period: 3/21/18 - 6/20/18 Time: 12:13 PM   ===========================================================================================  I certify that the above Physical Therapy Services are being furnished while the patient is under my care. I agree with the treatment plan and certify that this therapy is necessary. Physician Signature:        Date:       Time:     Please sign and return to [employer] or you may fax the signed copy to [phone]. Thank you.

## 2018-03-21 NOTE — PROGRESS NOTES
PHYSICAL THERAPY - DAILY TREATMENT NOTE    Patient Name: Carlos Haynes        Date: 3/21/2018  : 1945   YES Patient  Verified  Visit #:   1     Insurance: Payor: Paris Henderson / Plan: [place] & [place] / Product Type: Medicare /      In time: 2:30 Out time: 3:00   Total Treatment Time: 30 min     Medicare Time Tracking (below)   Total Timed Codes (min):  30 1:1 Treatment Time:  30     TREATMENT AREA =  R shoulder pain    SUBJECTIVE    Pain Level (on 0 to 10 scale):  0  / 10   Medication Changes/New allergies or changes in medical history, any new surgeries or procedures? NO    If yes, update Summary List   Subjective Functional Status/Changes:  []  No changes reported     Patient's main complaint: patient reports tightening of R shoulder about 2.5 weeks ago. Pt reports that he sees Dr. Fredick Eisenmenger on [facility] for f/u tomorrow but is unsure what this appt is for. Patient reports he is not having R shoulder pain at this time. Pt states the pain \"comes and goes. \" Patient reports they did not do x-ray or MRI on the shoulder. Patient reports the pain is only in the front/lateral of the shoulder (pointing to right deltoid tuberosity). Pt reports pain up to 4-5/10 at worst. Pt reports he manages pain at this time by \"ignoring it. \"    What reproduces symptoms: Lifting arm up, reaching    What alleviates symptoms: rest, no use, rubbing shoulder    Chief limitations:   Lifting, reaching, driving    Occupation: retired. Socially active    Goals: No pain        OBJECTIVE    Physical Therapy Evaluation - Shoulder    A. Cervical Spine  Dermatomes WNL [x]   C/S ROM WNL [x]    B. Observation:    Posture: [] Poor    [x] Fair    [] Good    Describe: Atrophy of muscle tissue? [] Yes  [x] No  Location:    Visible Defects?  [] Yes  [x] No (ecchymosis, edema, inflammation, deformities)     ROM:  [] Unable to assess at this time     Painful Arc: [] Yes  [] No   Approximate location in ROM where pain begins: AROM                                               PROM   Right  Right   Flexion 130 std, 140 supine Flexion 150   Extension 55 Extension NT   Scaption/ Scaption/   ER @ 0 Degrees NT ER @ 0 Degrees NT   ER @ 90 Degrees 75 ER @ 90 Degrees 80   IR @ 90 Degrees 45 IR @ 90 Degrees 45   IR HBB 25% decr     Functional ER R PSIS         R Shoulder ROM: At D/C 12/1/2017:                                                      SMXV                                                               PROM     Right    Right   Flexion  140 Flexion 145   Scaption/ Scaption/   ER @ 0 Degrees 75 ER @ 45 Degrees 85   IR HBB (cm from C7) L5 IR @ 45 degrees NT   IR @ 90 Degrees 50 ER @ 90 Degrees 60         End Feel  Empty:  [] Yes  [x] No   Firm:  [x] Yes  [] No   Hard:  [] Yes  [x] No     Accessory Motion Testing  Laxity Present? [] Yes   [x] No       BUE strength WNL    Scapulohumoral Control / Rhythm:  Able to eccentrically lower with good control? Left: [x] Yes   [] No     Right: [x] Yes   [] No    Palpation  [] Min  [x] Mod  [] Severe    Location: right deltoid tuberosity     Special Tests:  Impingement:   Neer's Test  [] Pos   [x] Neg   Hawkin's Test  [] Pos   [x] Neg  Cross body adduction [] Pos   [x] Neg    RTC:    Empty Can  [] Pos   [x] Neg  Shrug Sign  [] Pos   [x] Neg  Drop Arm Test  [] Pos   [x] Neg     Others:   SC Joint  [] Pos   [x] Neg  Pectoral Tightness [x] Pos   [] Neg  Anterior Apprehension [] Pos   [x] Neg   Posterior Apprehension [] Pos   [x] Neg    throughout min Patient Education:  YES  Reviewed HEP   []  Progressed/Changed HEP based on: Other Objective/Functional Measures:    See objective measures above     Post Treatment Pain Level (on 0 to 10) scale:   0  / 10     ASSESSMENT    Assessment/Changes in Function:     Patient History: High (Hx RTC repair, Age, Arthritis, Hx knee replacement)  Examination (low 1-2, mod 3+, high 4+):  Moderate per objective above  Clinical Presentation (low stable or uncomplicated, mod evolving or changing, high unstable or unpredictable): Low stable  Clinical Decision Making (low , mod 26-74, high 1-25): FOTO Moderate    arthritis; HTN; asthma; Hx CABG; R knee replacement; R RCR; Hx prostate resection; allergies     []  See Progress Note/Recertification   Patient will continue to benefit from skilled PT services to modify and progress therapeutic interventions, address functional mobility deficits, address ROM deficits, address strength deficits, analyze and address soft tissue restrictions, analyze and cue movement patterns, analyze and modify body mechanics/ergonomics, assess and modify postural abnormalities and instruct in home and community integration to attain remaining goals. to attain remaining goals.    Progress toward goals / Updated goals:    See POC     PLAN    [x]  Upgrade activities as tolerated YES Continue plan of care   []  Discharge due to :    []  Other:      Therapist: Bambi Landon DPT    Date: 3/21/2018 Time: 12:10 PM

## 2018-04-04 ENCOUNTER — HOSPITAL ENCOUNTER (OUTPATIENT)
Dept: PHYSICAL THERAPY | Age: 73
Discharge: HOME | End: 2018-04-04
Payer: MEDICARE

## 2018-04-04 ENCOUNTER — APPOINTMENT (OUTPATIENT)
Dept: PHYSICAL THERAPY | Age: 73
End: 2018-04-04
Payer: MEDICARE

## 2018-04-04 PROCEDURE — G8985 CARRY GOAL STATUS: HCPCS

## 2018-04-04 PROCEDURE — G8986 CARRY D/C STATUS: HCPCS

## 2018-04-04 PROCEDURE — 97530 THERAPEUTIC ACTIVITIES: CPT

## 2018-04-04 NOTE — PROGRESS NOTES
PT DAILY TREATMENT NOTE     Patient Name: Bradford Rodriguez  Date:2018  : 1945  [x]  Patient  Verified  Payor: [place] MEDICARE / Plan: [place] MEDICARE PART A & B / Product Type: Medicare /    In time:315  Out time:330  Total Treatment Time (min): 15  Total Timed Codes (min): 15  1:1 Treatment Time MC (min): 15   Visit #: 2 of 8    Treatment Area: Pain in right shoulder [M25.511]    SUBJECTIVE  Pain Level (0-10 scale): 0/10  Any medication changes, allergies to medications, adverse drug reactions, diagnosis change, or new procedure performed?: [x] No    [] Yes (see summary sheet for update)  Subjective functional status/changes:   [] No changes reported  See DC summary    OBJECTIVE  Modality rationale:  The below therapeutic interventions were performed and/or provided to:    Min Type Additional Details    [] Estim: []Att   []Unatt        []TENS instruct                  []IFC  []Premod   []NMES                     []Other:  []w/US   []w/ice   []w/heat  Position:  Location:    []  Traction: [] Cervical       []Lumbar                       [] Prone          []Supine                       []Intermittent   []Continuous Lbs:  [] before manual  [] after manual    []  Ultrasound: []Continuous   [] Pulsed                           []1MHz   []3MHz Location:  W/cm2:    []  Iontophoresis with dexamethasone         Location: [] Take home patch   [] In clinic    []  Ice     []  heat  []  Ice massage Position:  Location:    []  Vasopneumatic Device Pressure:       [] lo [] med [] hi   Temperature: [] lo [] med [] hi   [] Skin assessment post-treatment:  []intact []redness- no adverse reaction       []redness - adverse reaction:        min Therapeutic Exercise:  [] See flow sheet :   Rationale:     15 min Therapeutic Activity:  []  See flow sheet :   Rationale: pt educated on importance of continuation with HEP and advised to contact MD if pain or any symptoms reappear and he was with verbalized understanding      min Neuromuscular Re-education:  []  See flow sheet :   Rationale:         min Manual Therapy:     Rationale:       min Gait Training:  ___ feet with ___ device on level surfaces with ___ level of assist   Rationale:           min Patient Education: [x] Review HEP    [] Progressed/Changed HEP based on:   [] positioning   [] body mechanics   [] transfers   [] heat/ice application          Other Objective/Functional Measures:       Pain Level (0-10 scale) post treatment: 0/10    ASSESSMENT/Changes in Function:   See DC    All AROM right [address] joint pain free and functional         []  See Plan of Care  []  See progress note/recertification  [x]  See Discharge Summary         Progress towards goals / Updated goals:      PLAN  []  Upgrade activities as tolerated     []  Continue plan of care  []  Update interventions per flow sheet       [x]  Discharge due to:_ goals met and pt requested DC  []  Other:_      Darin Fernandes, PT 4/4/2018  4:40 PM

## 2018-04-04 NOTE — PROGRESS NOTES
[facility] PHYSICAL THERAPY  [address] [place] [address] Erick Andrew, [address] - Phone: [phone]  Fax: [phone] SUMMARY FOR PHYSICAL THERAPY  Patient Name: Kim Dear :    Treatment Diagnosis: Pain in right shoulder [M25.511] Onset Date: One month ago   Referral Source: Denise Andrews [facility]): 3/22/18   Prior Hospitalization: NA Provider #: [id number]   Prior Level of Function: Prior right RTC repair 17   Comorbidities: arthritis; HTN; asthma; Hx CABG; R knee replacement; R RCR; Hx prostate resection; allergies   Visits from [facility]: 1 Missed Visits: 0       Status at Last Eval:  Shoulder                  AROM                                               PROM     Right    Right   Flexion 130 std, 140 supine Flexion 150   Extension 55 Extension NT   Scaption/ Scaption/   ER @ 0 Degrees NT ER @ 0 Degrees NT   ER @ 90 Degrees 75 ER @ 90 Degrees 80   IR @ 90 Degrees 45 IR @ 90 Degrees 45   IR HBB 25% decr         Functional ER R PSIS             Current Status:  Pt currently has no pain and functional AROM throughout the right shoulder. Pt requests DC secondary to such and said he is compliant with HEP. Key Functional Changes/Progress:   Goal/Measure of Progress Goal Met? · Short Term Goals: To be accomplished in  4  Visits:  1. Patient will be compliant with HEP in order to facilitate progression of therapeutic exercise and improve mobility-MET  2. Patient will report at least 25% improvement in current symptoms to improve ease with reaching/lifting-MET     · Long Term Goals: To be accomplished in  8  Visitis  1. Patient will be independent with HEP in order to demonstrate ability to perform therapeutic exercises and continue progressing functional mobility upon D/C-MET  2.  Patient will restore right shoulder flexion AROM to 140 degrees without pain to improve ease with ADLs and self care tasks- DEGREES  3. Patient will improve FOTO score to 73% to demonstrate a meaningful improvement in functional mobility and increased quality of life-NOT MET 69/100    Assessments/Recommendations: Other: Pt requested DC secondary to no pain and full ROM throughout the right shoulder. He said he is independent in HEP. Self DC. Specifics:     Summary of Care: Thank you for referring this pleasant patient. Patient is ready to assumeI management of home exercise program to assist with keeping pain level down and decrease change of reoccurence of initial symptoms. Will advise patient that if any additional follow up or recommendation is needed to return to referring physician. Reason for Discharge:  [] The patient was non-compliant with attendance. [] The patient could not be contacted to schedule further therapy sessions. [] The patient has been discharged based on the orders of the referring physician. [x] The patient has requested to be discharged due to:  No c/o functional deficits   [] Patient has met all goals or max benefit has been achieved    G-Codes (GP): Carry:   Goal  CJ= 20-39%   D/C  CJ= 20-39%. The severity rating is based on the FOTO Score    If you have any questions/comments please contact us directly at [phone]. Thank you for allowing us to assist in the care of your patient.     Therapist Signature: Vita Arredondo PT Date: 6/1/24   Certification Period: MEDICARE ONLY   3/22/18-4/4/18 Time: 4:30 PM

## 2020-09-09 ENCOUNTER — HOSPITAL ENCOUNTER (OUTPATIENT)
Dept: RADIATION THERAPY | Age: 75
Discharge: HOME | End: 2020-09-09
Payer: MEDICARE

## 2020-09-09 PROCEDURE — 99211 OFF/OP EST MAY X REQ PHY/QHP: CPT

## 2020-09-22 ENCOUNTER — HOSPITAL ENCOUNTER (OUTPATIENT)
Dept: LAB | Age: 75
Discharge: HOME | End: 2020-09-22
Payer: MEDICARE

## 2020-09-22 ENCOUNTER — HOSPITAL ENCOUNTER (OUTPATIENT)
Dept: RADIATION THERAPY | Age: 75
Discharge: HOME | End: 2020-09-22
Payer: MEDICARE

## 2020-09-22 DIAGNOSIS — C61 MALIGNANT NEOPLASM OF PROSTATE (HCC): ICD-10-CM

## 2020-09-22 LAB
ANION GAP SERPL CALC-SCNC: 2 MMOL/L (ref 3–18)
APPEARANCE UR: CLEAR
BACTERIA URNS QL MICRO: NEGATIVE /HPF
BASOPHILS # BLD: 0 K/UL (ref 0–0.1)
BASOPHILS NFR BLD: 0 % (ref 0–2)
BILIRUB UR QL: NEGATIVE
BUN SERPL-MCNC: 14 MG/DL (ref 7–18)
BUN/CREAT SERPL: 15 (ref 12–20)
CALCIUM SERPL-MCNC: 9.4 MG/DL (ref 8.5–10.1)
CHLORIDE SERPL-SCNC: 103 MMOL/L (ref 100–111)
CO2 SERPL-SCNC: 34 MMOL/L (ref 21–32)
COLOR UR: YELLOW
CREAT SERPL-MCNC: 0.95 MG/DL (ref 0.6–1.3)
DIFFERENTIAL METHOD BLD: ABNORMAL
EOSINOPHIL # BLD: 0.1 K/UL (ref 0–0.4)
EOSINOPHIL NFR BLD: 2 % (ref 0–5)
EPITH CASTS URNS QL MICRO: NEGATIVE /LPF (ref 0–5)
ERYTHROCYTE [DISTWIDTH] IN BLOOD BY AUTOMATED COUNT: 13.1 % (ref 11.6–14.5)
GLUCOSE SERPL-MCNC: 87 MG/DL (ref 74–99)
GLUCOSE UR STRIP.AUTO-MCNC: NEGATIVE MG/DL
HCT VFR BLD AUTO: 49.9 % (ref 36–48)
HGB BLD-MCNC: 16.8 G/DL (ref 13–16)
HGB UR QL STRIP: NEGATIVE
KETONES UR QL STRIP.AUTO: NEGATIVE MG/DL
LEUKOCYTE ESTERASE UR QL STRIP.AUTO: NEGATIVE
LYMPHOCYTES # BLD: 1.3 K/UL (ref 0.9–3.6)
LYMPHOCYTES NFR BLD: 17 % (ref 21–52)
MCH RBC QN AUTO: 31.1 PG (ref 24–34)
MCHC RBC AUTO-ENTMCNC: 33.7 G/DL (ref 31–37)
MCV RBC AUTO: 92.2 FL (ref 74–97)
MONOCYTES # BLD: 0.5 K/UL (ref 0.05–1.2)
MONOCYTES NFR BLD: 6 % (ref 3–10)
NEUTS SEG # BLD: 5.6 K/UL (ref 1.8–8)
NEUTS SEG NFR BLD: 75 % (ref 40–73)
NITRITE UR QL STRIP.AUTO: NEGATIVE
PH UR STRIP: 5.5 [PH] (ref 5–8)
PLATELET # BLD AUTO: 188 K/UL (ref 135–420)
PMV BLD AUTO: 10.3 FL (ref 9.2–11.8)
POTASSIUM SERPL-SCNC: 3.9 MMOL/L (ref 3.5–5.5)
PROT UR STRIP-MCNC: NEGATIVE MG/DL
PSA SERPL-MCNC: 1.1 NG/ML (ref 0–4)
RBC # BLD AUTO: 5.41 M/UL (ref 4.7–5.5)
RBC #/AREA URNS HPF: NEGATIVE /HPF (ref 0–5)
SODIUM SERPL-SCNC: 139 MMOL/L (ref 136–145)
SP GR UR REFRACTOMETRY: 1.02 (ref 1–1.03)
UROBILINOGEN UR QL STRIP.AUTO: 0.2 EU/DL (ref 0.2–1)
WBC # BLD AUTO: 7.5 K/UL (ref 4.6–13.2)
WBC URNS QL MICRO: NORMAL /HPF (ref 0–4)

## 2020-09-22 PROCEDURE — 85025 COMPLETE CBC W/AUTO DIFF WBC: CPT

## 2020-09-22 PROCEDURE — 36415 COLL VENOUS BLD VENIPUNCTURE: CPT

## 2020-09-22 PROCEDURE — 76873 ECHOGRAP TRANS R PROS STUDY: CPT

## 2020-09-22 PROCEDURE — 93005 ELECTROCARDIOGRAM TRACING: CPT

## 2020-09-22 PROCEDURE — 80048 BASIC METABOLIC PNL TOTAL CA: CPT

## 2020-09-22 PROCEDURE — 87086 URINE CULTURE/COLONY COUNT: CPT

## 2020-09-22 PROCEDURE — 84153 ASSAY OF PSA TOTAL: CPT

## 2020-09-22 PROCEDURE — 84403 ASSAY OF TOTAL TESTOSTERONE: CPT

## 2020-09-22 PROCEDURE — 81001 URINALYSIS AUTO W/SCOPE: CPT

## 2020-09-23 ENCOUNTER — HOSPITAL ENCOUNTER (OUTPATIENT)
Dept: RADIATION THERAPY | Age: 75
Discharge: HOME | End: 2020-09-23
Payer: MEDICARE

## 2020-09-23 LAB
ATRIAL RATE: 65 BPM
CALCULATED P AXIS, ECG09: 50 DEGREES
CALCULATED R AXIS, ECG10: -78 DEGREES
CALCULATED T AXIS, ECG11: 49 DEGREES
DIAGNOSIS, 93000: NORMAL
P-R INTERVAL, ECG05: 170 MS
Q-T INTERVAL, ECG07: 452 MS
QRS DURATION, ECG06: 150 MS
QTC CALCULATION (BEZET), ECG08: 470 MS
TESTOST SERPL-MCNC: <3 NG/DL (ref 264–916)
VENTRICULAR RATE, ECG03: 65 BPM

## 2020-09-24 ENCOUNTER — HOSPITAL ENCOUNTER (OUTPATIENT)
Dept: RADIATION THERAPY | Age: 75
Discharge: HOME | End: 2020-09-24
Payer: MEDICARE

## 2020-09-24 LAB
BACTERIA SPEC CULT: NORMAL
SERVICE CMNT-IMP: NORMAL

## 2020-09-25 ENCOUNTER — HOSPITAL ENCOUNTER (OUTPATIENT)
Dept: RADIATION THERAPY | Age: 75
Discharge: HOME | End: 2020-09-25
Payer: MEDICARE

## 2020-09-28 ENCOUNTER — HOSPITAL ENCOUNTER (OUTPATIENT)
Dept: RADIATION THERAPY | Age: 75
Discharge: HOME | End: 2020-09-28
Payer: MEDICARE

## 2020-09-29 ENCOUNTER — TRANSCRIBE ORDER (OUTPATIENT)
Dept: SCHEDULING | Age: 75
End: 2020-09-29

## 2020-09-29 DIAGNOSIS — C61 PROSTATE CANCER (HCC): Primary | ICD-10-CM

## 2020-09-30 ENCOUNTER — HOSPITAL ENCOUNTER (OUTPATIENT)
Dept: RADIATION THERAPY | Age: 75
Discharge: HOME | End: 2020-09-30
Payer: MEDICARE

## 2020-10-05 ENCOUNTER — ANESTHESIA (OUTPATIENT)
Dept: RADIATION THERAPY | Age: 75
End: 2020-10-05
Payer: MEDICARE

## 2020-10-05 ENCOUNTER — ANESTHESIA EVENT (OUTPATIENT)
Dept: RADIATION THERAPY | Age: 75
End: 2020-10-05
Payer: MEDICARE

## 2020-10-05 ENCOUNTER — HOSPITAL ENCOUNTER (OUTPATIENT)
Dept: RADIATION THERAPY | Age: 75
Discharge: HOME | End: 2020-10-05
Payer: MEDICARE

## 2020-10-05 VITALS
TEMPERATURE: 97.5 F | SYSTOLIC BLOOD PRESSURE: 109 MMHG | BODY MASS INDEX: 26.76 KG/M2 | HEART RATE: 72 BPM | DIASTOLIC BLOOD PRESSURE: 51 MMHG | OXYGEN SATURATION: 95 % | RESPIRATION RATE: 13 BRPM | WEIGHT: 176.6 LBS | HEIGHT: 68 IN

## 2020-10-05 PROCEDURE — 76060000036 HC ANESTHESIA 2.5 TO 3 HR

## 2020-10-05 PROCEDURE — 77030032490 HC SLV COMPR SCD KNE COVD -B

## 2020-10-05 PROCEDURE — 77030036874 HC SPCR RECTAL HYDRGEL SPACEOAR AGMX -I

## 2020-10-05 PROCEDURE — 77030034850

## 2020-10-05 PROCEDURE — A4648 IMPLANTABLE TISSUE MARKER: HCPCS

## 2020-10-05 PROCEDURE — 74011250636 HC RX REV CODE- 250/636: Performed by: RADIOLOGY

## 2020-10-05 PROCEDURE — 73290000072 HC RAD ONC TIME 2.5 TO 3 HR

## 2020-10-05 PROCEDURE — 77030015736 HC BLLN ENDOCAV CIVC -B

## 2020-10-05 PROCEDURE — 77318 BRACHYTX ISODOSE COMPLEX: CPT

## 2020-10-05 PROCEDURE — 77030018846 HC SOL IRR STRL H20 ICUM -A

## 2020-10-05 PROCEDURE — 74011000250 HC RX REV CODE- 250: Performed by: RADIOLOGY

## 2020-10-05 PROCEDURE — 77030019927 HC TBNG IRR CYSTO BAXT -A

## 2020-10-05 PROCEDURE — 77772 HDR RDNCL NTRSTL/ICAV BRCHTX: CPT

## 2020-10-05 PROCEDURE — 51798 US URINE CAPACITY MEASURE: CPT

## 2020-10-05 PROCEDURE — C1717 BRACHYTX, NON-STR,HDR IR-192: HCPCS

## 2020-10-05 PROCEDURE — 77030018836 HC SOL IRR NACL ICUM -A

## 2020-10-05 PROCEDURE — 74011250637 HC RX REV CODE- 250/637: Performed by: RADIOLOGY

## 2020-10-05 PROCEDURE — 77332 RADIATION TREATMENT AID(S): CPT

## 2020-10-05 RX ORDER — LIDOCAINE HYDROCHLORIDE 20 MG/ML
JELLY TOPICAL ONCE
Status: COMPLETED | OUTPATIENT
Start: 2020-10-05 | End: 2020-10-05

## 2020-10-05 RX ORDER — MIDAZOLAM HYDROCHLORIDE 1 MG/ML
INJECTION, SOLUTION INTRAMUSCULAR; INTRAVENOUS AS NEEDED
Status: DISCONTINUED | OUTPATIENT
Start: 2020-10-05 | End: 2020-10-05 | Stop reason: HOSPADM

## 2020-10-05 RX ORDER — CEFAZOLIN SODIUM 2 G/50ML
2 SOLUTION INTRAVENOUS ONCE
Status: COMPLETED | OUTPATIENT
Start: 2020-10-05 | End: 2020-10-05

## 2020-10-05 RX ORDER — SODIUM CHLORIDE 0.9 % (FLUSH) 0.9 %
10 SYRINGE (ML) INJECTION AS NEEDED
Status: DISCONTINUED | OUTPATIENT
Start: 2020-10-05 | End: 2020-10-09 | Stop reason: HOSPADM

## 2020-10-05 RX ORDER — FAMOTIDINE 20 MG/1
20 TABLET, FILM COATED ORAL ONCE
Status: COMPLETED | OUTPATIENT
Start: 2020-10-05 | End: 2020-10-05

## 2020-10-05 RX ORDER — SODIUM CHLORIDE, SODIUM LACTATE, POTASSIUM CHLORIDE, CALCIUM CHLORIDE 600; 310; 30; 20 MG/100ML; MG/100ML; MG/100ML; MG/100ML
75 INJECTION, SOLUTION INTRAVENOUS CONTINUOUS
Status: DISCONTINUED | OUTPATIENT
Start: 2020-10-05 | End: 2020-10-09 | Stop reason: HOSPADM

## 2020-10-05 RX ORDER — ONDANSETRON 2 MG/ML
INJECTION INTRAMUSCULAR; INTRAVENOUS AS NEEDED
Status: DISCONTINUED | OUTPATIENT
Start: 2020-10-05 | End: 2020-10-05 | Stop reason: HOSPADM

## 2020-10-05 RX ORDER — DEXAMETHASONE SODIUM PHOSPHATE 4 MG/ML
INJECTION, SOLUTION INTRA-ARTICULAR; INTRALESIONAL; INTRAMUSCULAR; INTRAVENOUS; SOFT TISSUE AS NEEDED
Status: DISCONTINUED | OUTPATIENT
Start: 2020-10-05 | End: 2020-10-05 | Stop reason: HOSPADM

## 2020-10-05 RX ORDER — SODIUM CHLORIDE, SODIUM LACTATE, POTASSIUM CHLORIDE, CALCIUM CHLORIDE 600; 310; 30; 20 MG/100ML; MG/100ML; MG/100ML; MG/100ML
INJECTION, SOLUTION INTRAVENOUS
Status: DISCONTINUED | OUTPATIENT
Start: 2020-10-05 | End: 2020-10-05 | Stop reason: HOSPADM

## 2020-10-05 RX ORDER — PROPOFOL 10 MG/ML
INJECTION, EMULSION INTRAVENOUS AS NEEDED
Status: DISCONTINUED | OUTPATIENT
Start: 2020-10-05 | End: 2020-10-05 | Stop reason: HOSPADM

## 2020-10-05 RX ORDER — FENTANYL CITRATE 50 UG/ML
INJECTION, SOLUTION INTRAMUSCULAR; INTRAVENOUS AS NEEDED
Status: DISCONTINUED | OUTPATIENT
Start: 2020-10-05 | End: 2020-10-05 | Stop reason: HOSPADM

## 2020-10-05 RX ADMIN — FAMOTIDINE 20 MG: 20 TABLET, FILM COATED ORAL at 12:37

## 2020-10-05 RX ADMIN — LIDOCAINE HYDROCHLORIDE 5 ML: 20 JELLY TOPICAL at 17:17

## 2020-10-05 RX ADMIN — CEFAZOLIN 2 G: 10 INJECTION, POWDER, FOR SOLUTION INTRAVENOUS at 13:33

## 2020-10-05 RX ADMIN — FENTANYL CITRATE 25 MCG: 50 INJECTION, SOLUTION INTRAMUSCULAR; INTRAVENOUS at 15:43

## 2020-10-05 RX ADMIN — FENTANYL CITRATE 25 MCG: 50 INJECTION, SOLUTION INTRAMUSCULAR; INTRAVENOUS at 14:28

## 2020-10-05 RX ADMIN — FENTANYL CITRATE 25 MCG: 50 INJECTION, SOLUTION INTRAMUSCULAR; INTRAVENOUS at 14:24

## 2020-10-05 RX ADMIN — DEXAMETHASONE SODIUM PHOSPHATE 8 MG: 4 INJECTION, SOLUTION INTRA-ARTICULAR; INTRALESIONAL; INTRAMUSCULAR; INTRAVENOUS; SOFT TISSUE at 13:49

## 2020-10-05 RX ADMIN — ONDANSETRON 4 MG: 2 INJECTION INTRAMUSCULAR; INTRAVENOUS at 13:49

## 2020-10-05 RX ADMIN — PROPOFOL 150 MG: 10 INJECTION, EMULSION INTRAVENOUS at 13:38

## 2020-10-05 RX ADMIN — SODIUM CHLORIDE, SODIUM LACTATE, POTASSIUM CHLORIDE, CALCIUM CHLORIDE: 600; 310; 30; 20 INJECTION, SOLUTION INTRAVENOUS at 13:31

## 2020-10-05 RX ADMIN — Medication 10 ML: at 12:02

## 2020-10-05 RX ADMIN — FENTANYL CITRATE 25 MCG: 50 INJECTION, SOLUTION INTRAMUSCULAR; INTRAVENOUS at 13:51

## 2020-10-05 RX ADMIN — MIDAZOLAM HYDROCHLORIDE 1 MG: 1 INJECTION, SOLUTION INTRAMUSCULAR; INTRAVENOUS at 13:32

## 2020-10-05 RX ADMIN — SODIUM CHLORIDE, SODIUM LACTATE, POTASSIUM CHLORIDE, AND CALCIUM CHLORIDE 75 ML/HR: 600; 310; 30; 20 INJECTION, SOLUTION INTRAVENOUS at 12:02

## 2020-10-05 NOTE — ROUTINE PROCESS
POST PROCEDURE NOTE    Pt arrived to post procedure area A at, pt in supine with head of bed elevated, monitors placed. Patient discharged with Wilson Catheter, care & instructions for emptying taught; patient verbalized understanding. Patient discharged from procedure area A: [id number]    Post Operative instruction and Discharge information reviewed and copy given to patient and wife. The patient and spouse verbalized understanding. [phone] D/C'd via W/C with wife driving transportation home.

## 2020-10-05 NOTE — ANESTHESIA PREPROCEDURE EVALUATION
Relevant Problems   No relevant active problems       Anesthetic History   No history of anesthetic complications            Review of Systems / Medical History  Patient summary reviewed and pertinent labs reviewed    Pulmonary        Sleep apnea: CPAP    Asthma        Neuro/Psych   Within defined limits           Cardiovascular    Hypertension          CAD and CABG         GI/Hepatic/Renal                Endo/Other        Arthritis and cancer     Other Findings              Physical Exam    Airway  Mallampati: II  TM Distance: 4 - 6 cm  Neck ROM: normal range of motion   Mouth opening: Normal     Cardiovascular    Rhythm: regular  Rate: normal         Dental    Dentition: Poor dentition     Pulmonary  Breath sounds clear to auscultation               Abdominal  GI exam deferred       Other Findings            Anesthetic Plan    ASA: 3  Anesthesia type: general          Induction: Intravenous  Anesthetic plan and risks discussed with: Patient

## 2020-10-05 NOTE — PROGRESS NOTES
1105: Patient arrived for procedure. A&Ox4. Per patients report bowel prep complete. NPO since midnight. Reviewed Allergies and PTA medications. Consent verified and in chart. Denies pain or any other discomfort. Denies delusions, hallucinations, mood swings, depression, euphoria or suicidal ideation.

## 2020-10-05 NOTE — PROGRESS NOTES
1105: Patient arrived for procedure. A&Ox4. Per patients report bowel prep complete. NPO since midnight. Reviewed Allergies and PTA medications. Consent verified and in chart. Denies pain or any other discomfort. Denies delusions, hallucinations, mood swings, depression, euphoria or suicidal ideation. 1215 Discharge instructions completed with patient. Verbalized understanding.

## 2020-10-05 NOTE — DISCHARGE INSTRUCTIONS
Post-Operative HDR Brachytherapy Instructions      During the first few days you may have some bruising on the perineum (the place between your anus and your scrotum) or on the scrotum itself. This is caused by the needles which are used for placement. This will resolve on its own and usually does not cause any discomfort. Rarely a larger hematoma may form on the perineum and this will cause some discomfort with sitting. This should be brought to the doctors attention, but it will resolve on its own. You can alternate between an ice pack and heat pack for 10 minutes interval to assist with the reduction of inflammation and pain to perineum. Side Effects    Immediately post procedure: blood in the urine, bruising/tenderness/discoloration at the implant site, and/or swelling at the implant site. These symptoms should subside after a few days. Some patients will have trouble passing urine after the catheter is removed due to swelling in the prostate. You should call Dr. Ebony Valerio or go to Emergency Room if you cannot pass urine within 6 hours of catheter removal or any time if you are having trouble passing your urine. Some patients may require a catheter for 1 week or more for urinary retention. Other: The following side effects are most likely to occur over the first two months following the procedure: Frequency and/or urgency with urination, burning with urination, a weaker urine stream, as well as frequency and /or urgency of bowel movements. After the initial two months, you should notice a steady decline in these symptoms. Your symptoms should subside completely by the end of six month to a year. Diet:    Regular, unless you are on a special diet for other reasons. Activity:     Avoid heavy lifting, nothing >10lbs for 10 days after the procedure. At that time you may return to your normal activity level if the urine is clear and you feel fine.  If the urine is still bloody you should rest and drink plenty of fluids until it is clear, and then you may resume normal activity. Avoid strenuous activity for one month. Avoid sitting on a hard seat (such as a bicycle) for 2 months. Avoid long periods of sitting, such as in a car or on an airplane without taking leg stretching  breaks. Depending on the demands of your job, you may return to work any time during the week after the procedure, noting the precaution about prolonged sitting. You may return to a regular diet as tolerated following the procedure. Do not drink excessive amounts of fluids, as they can make side effects worse. You will experience a decrease in ejaculate following the procedures. This is normal, as the prostate gland is responsible for generating over 80% of the fluid disseminated during  ejaculation. You will most likely experience a reddish color in your ejaculate for a few weeks following the procedure. This is normal and will improve as time  passes, if it persists, see your doctor. Follow up     Your follow up imaging will be at [place] at [facility]/[address]  on __10/08/2020____________ at __0815__________ am.  [facility] [place] [phone]    Please call us if you have any questions: [phone]    Please call Dr iDa Maldonado office this week  @  [phone] a follow up appointment in 2 weeks    Radiation Therapy  DISCHARGE SUMMARY from Nurse       Diet:    Regular, unless you are on a special diet for other reasons.      Medication:     Patient may continue the following medications as directed by Physician:     Albuterol Sulfate HFA Aerosol, solution Inhalation -RESUME AS NEEDED  Aspirin (81 mg) Tablet, chewable Oral -RESUME 10/06/20  Caltrate 600+D3 Tablet Oral-RESUME  Cholecalciferol (50 MCG ) Tablet Oral-RESUME  Ciprofloxacin HCl (500 mg) Tablet Oral Take as Directed  START 10/06/20  Coenzyme Q10-Vitamin E (100-100 mg - Units) Capsule Oral -RESUME  Degarelix Acetate(240 MG Dose) (120 mg/vial) Subcutaneous -CONTINUE  Isosorbide Mononitrate ER (30 mg) Tablet SR 24 HR Oral-RESUME  Metoprolol Succinate ER (25 mg) Tablet SR 24 HR Oral-RESUME  Montelukast Sodium (10 mg) Tablet Oral-RESUME  Naproxen (500 mg) Tablet Oral Take as Directed-START 10/06/20  Pravastatin Sodium (80 mg) Tablet Oral-RESUME  Tamsulosin HCl (0. 4 mg) Capsule Oral Take as Directed (9/22/2020)       Please call us if you have any questions: [phone]    Radiation Therapy     DISCHARGE SUMMARY from Nurse    The following personal items are in your possession at time of discharge:  Clothing, crocs, glasses, wallet, cell phone, book      PATIENT INSTRUCTIONS:    After general anesthesia or intravenous sedation, for 24 hours or while taking prescription Narcotics:  · Limit your activities  · Do not drive and operate hazardous machinery  · Do not make important personal or business decisions  · Do  not drink alcoholic beverages  · If you have not urinated within 8 hours after discharge, please contact your surgeon on call. Report the following to your surgeon:  · Excessive pain, swelling, redness or odor of or around the surgical area  · Temperature over 100.5F  · Nausea and vomiting lasting longer than 4 hours or if unable to take medications  · Any signs of decreased circulation or nerve impairment to extremity: change in color, persistent  numbness, tingling, coldness or increase pain  · Any questions      What to do at Home:    Recommended activity: Activity as tolerated and no driving for today. *  Please give a list of your current medications to your Primary Care Provider. *  Please update this list whenever your medications are discontinued, doses are      changed, or new medications (including over-the-counter products) are added. *  Please carry medication information at all times in case of emergency situations.           These are general instructions for a healthy lifestyle:    No smoking/ No tobacco products/ Avoid exposure to second hand smoke    Surgeon General's Warning:  Quitting smoking now greatly reduces serious risk to your health. Obesity, smoking, and sedentary lifestyle greatly increases your risk for illness    A healthy diet, regular physical exercise & weight monitoring are important for maintaining a healthy lifestyle    You may be retaining fluid if you have a history of heart failure or if you experience any of the following symptoms:  Weight gain of 3 pounds or more overnight or 5 pounds in a week, increased swelling in our hands or feet or shortness of breath while lying flat in bed. Please call your doctor as soon as you notice any of these symptoms; do not wait until your next office visit. Recognize signs and symptoms of STROKE:    F-face looks uneven    A-arms unable to move or move unevenly    S-speech slurred or non-existent    T-time-call 911 as soon as signs and symptoms begin-DO NOT go       Back to bed or wait to see if you get better-TIME IS BRAIN. Warning Signs of HEART ATTACK     Call 911 if you have these symptoms:   Chest discomfort. Most heart attacks involve discomfort in the center of the chest that lasts more than a few minutes, or that goes away and comes back. It can feel like uncomfortable pressure, squeezing, fullness, or pain.  Discomfort in other areas of the upper body. Symptoms can include pain or discomfort in one or both arms, the back, neck, jaw, or stomach.  Shortness of breath with or without chest discomfort.  Other signs may include breaking out in a cold sweat, nausea, or lightheadedness. Don't wait more than five minutes to call 911 - MINUTES MATTER! Fast action can save your life. Calling 911 is almost always the fastest way to get lifesaving treatment. Emergency Medical Services staff can begin treatment when they arrive -- up to an hour sooner than if someone gets to the hospital by car.        The discharge information has been reviewed with the patient and caregiver. The patient and caregiver verbalized understanding. Discharge medications reviewed with the patient and caregiver and appropriate educational materials and side effects teaching were provided.

## 2020-10-06 ENCOUNTER — HOSPITAL ENCOUNTER (OUTPATIENT)
Dept: RADIATION THERAPY | Age: 75
Discharge: HOME | End: 2020-10-06
Payer: MEDICARE

## 2020-10-06 PROCEDURE — 51798 US URINE CAPACITY MEASURE: CPT

## 2020-10-06 NOTE — OP NOTES
Gasper Lan MD    Physician    Urology    Op Notes        Signed    Date of Service:  10/5/2020                     Date of Procedure: 10/5/2020                 Preoperative Diagnosis:  76 yo male with Clinical stage T1c, Nx, Mx, Aurora's Grade 8 (4+4),                 Presenting PSA of 5.26, Prostate volume 14.7               Postoperative Diagnosis: Same      Procedure:  Transrectal Ultrasound guided Transperineal HDR Interstitial Prostate Brachytherapy Fraction 1 of 1, Flexible cystoscopy     Surgeon: Micki Suárez MD  Radiation Oncologist: Perfecto Mcintyre MD     Surgical Assistant: none     Anesthesia: General  Estimated Blood Loss: minimal  Specimens: none  Findings:       Complications: none     Implants: none     Findings:  Prostate Volume:  14.67cc, Fraction Dose: 15 Gy, Activity: 8.539Ci, Needles: 14, Dwell positions:  78,  V100:  99.06 %.     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 Presenting PSA 5.26, TRUS volume 14.57.   All treatment options have been discussed.  The patient has selected 1 fraction of of HDR Brachytherapy with IMRT boost as definitive therapy for his prostate cancer.     DESCRIPTION OF PROCEDURE: The patient was identified in the holding area,  given informed consent again, and then was taken to the brachytherapy suite. On the brachytherapy table, the patient was placed in the supine position and  underwent adequate general anesthetic and then was placed in dorsal   lithotomy position, appropriately padded, prepped and draped in the usual  sterile fashion for transperineal procedure. The patient had a time-out  taken, all were in agreement on the procedure. SCDs were placed for DVT  prophylaxis. Appropriate parenteral prophylactic antibiotics were given. The patient had burn precautions, beta blocker concerns addressed.  The  patient then underwent further prep with the scrotum placed cephalad, Arguelles  catheter with contrast in the balloon was placed to identify the bladder  neck.     The patient then underwent transrectal ultrasonography. Perineum was  prepped and then real-time volumetrics and contouring was  performed. This was correlated with the pre-planned program from Dr. Doroteo Farmer with the correlation of these performed. The needle placements and dosimetry  were superimposed on real-time imaging and then we were able to  proceed with placement of the needles. The needles were placed in the usual  [address] brachy[address]rapy grid and brachytherapy reticulated arm. After needle placement, we confirmed adequate coverage of the  procedure, with greater than 99% V100. We then positioned the depths of each needle, in the longitudinal plane.  After the depths were set each needle was locked in position with needle lock and locking wrench. After each needle depth was properly set and the needle locked, flexible cystoscopy was performed to ensure there was no needle encroachment into the bladder.  Panendoscopy revealed no cathleen obstruction and no needles in the bladder or bladder abnormalities.  I replaced the arguelles catheter and Dr. Doroteo Farmer and staff placed the radiative source to the each needle per his separate dictation.      At this point, the procedure was terminated, Arguelles catheter removed, the brachytherapy grid and all the needles were removed in block, pressure was applied ti the perineum for hemostasis and the  perineum was cleansed with saline, and bacitracin ointment was applied.  The patient was carefully taken out of the dorsal lithotomy position, and  the patient was taken to the recovery room in stable condition.        Colette Lay MD

## 2020-10-07 ENCOUNTER — HOSPITAL ENCOUNTER (OUTPATIENT)
Dept: RADIATION THERAPY | Age: 75
Discharge: HOME | End: 2020-10-07
Payer: MEDICARE

## 2020-10-08 ENCOUNTER — HOSPITAL ENCOUNTER (OUTPATIENT)
Dept: MRI IMAGING | Age: 75
Discharge: HOME | End: 2020-10-08
Attending: RADIOLOGY
Payer: MEDICARE

## 2020-10-08 ENCOUNTER — TRANSCRIBE ORDER (OUTPATIENT)
Dept: RADIATION THERAPY | Age: 75
End: 2020-10-08

## 2020-10-08 ENCOUNTER — HOSPITAL ENCOUNTER (OUTPATIENT)
Dept: RADIATION THERAPY | Age: 75
Discharge: HOME | End: 2020-10-08
Payer: MEDICARE

## 2020-10-08 DIAGNOSIS — C61 PROSTATE CANCER (HCC): ICD-10-CM

## 2020-10-08 DIAGNOSIS — C61 MALIGNANT NEOPLASM OF PROSTATE (HCC): Primary | ICD-10-CM

## 2020-10-08 PROCEDURE — 77334 RADIATION TREATMENT AID(S): CPT

## 2020-10-08 PROCEDURE — 76498 UNLISTED MR PROCEDURE: CPT

## 2020-10-08 RX ORDER — IOHEXOL 240 MG/ML
50 INJECTION, SOLUTION INTRATHECAL; INTRAVASCULAR; INTRAVENOUS; ORAL
Qty: 50 ML | Refills: 0 | Status: SHIPPED
Start: 2020-10-08 | End: 2020-10-08

## 2020-10-09 ENCOUNTER — HOSPITAL ENCOUNTER (OUTPATIENT)
Dept: RADIATION THERAPY | Age: 75
Discharge: HOME | End: 2020-10-09
Payer: MEDICARE

## 2020-10-09 PROCEDURE — 77300 RADIATION THERAPY DOSE PLAN: CPT

## 2020-10-09 PROCEDURE — 77338 DESIGN MLC DEVICE FOR IMRT: CPT

## 2020-10-09 PROCEDURE — 77301 RADIOTHERAPY DOSE PLAN IMRT: CPT

## 2020-10-12 ENCOUNTER — HOSPITAL ENCOUNTER (OUTPATIENT)
Dept: RADIATION THERAPY | Age: 75
Discharge: HOME | End: 2020-10-12
Payer: MEDICARE

## 2020-10-13 ENCOUNTER — HOSPITAL ENCOUNTER (OUTPATIENT)
Dept: RADIATION THERAPY | Age: 75
Discharge: HOME | End: 2020-10-13
Payer: MEDICARE

## 2020-10-13 PROCEDURE — 77385 HC IMRT TRMT DLVR SMPL: CPT

## 2020-10-14 ENCOUNTER — HOSPITAL ENCOUNTER (OUTPATIENT)
Dept: RADIATION THERAPY | Age: 75
Discharge: HOME | End: 2020-10-14
Payer: MEDICARE

## 2020-10-14 PROCEDURE — 77385 HC IMRT TRMT DLVR SMPL: CPT

## 2020-10-15 ENCOUNTER — HOSPITAL ENCOUNTER (OUTPATIENT)
Dept: RADIATION THERAPY | Age: 75
Discharge: HOME | End: 2020-10-15
Payer: MEDICARE

## 2020-10-15 PROCEDURE — 77385 HC IMRT TRMT DLVR SMPL: CPT

## 2020-10-16 ENCOUNTER — HOSPITAL ENCOUNTER (OUTPATIENT)
Dept: RADIATION THERAPY | Age: 75
Discharge: HOME | End: 2020-10-16
Payer: MEDICARE

## 2020-10-16 PROCEDURE — 77385 HC IMRT TRMT DLVR SMPL: CPT

## 2020-10-19 ENCOUNTER — HOSPITAL ENCOUNTER (OUTPATIENT)
Dept: RADIATION THERAPY | Age: 75
Discharge: HOME | End: 2020-10-19
Payer: MEDICARE

## 2020-10-19 PROCEDURE — 77336 RADIATION PHYSICS CONSULT: CPT

## 2020-10-19 PROCEDURE — 77385 HC IMRT TRMT DLVR SMPL: CPT

## 2020-10-20 ENCOUNTER — HOSPITAL ENCOUNTER (OUTPATIENT)
Dept: RADIATION THERAPY | Age: 75
Discharge: HOME | End: 2020-10-20
Payer: MEDICARE

## 2020-10-20 PROCEDURE — 77385 HC IMRT TRMT DLVR SMPL: CPT

## 2020-10-21 ENCOUNTER — HOSPITAL ENCOUNTER (OUTPATIENT)
Dept: RADIATION THERAPY | Age: 75
Discharge: HOME | End: 2020-10-21
Payer: MEDICARE

## 2020-10-21 PROCEDURE — 77385 HC IMRT TRMT DLVR SMPL: CPT

## 2020-10-22 ENCOUNTER — HOSPITAL ENCOUNTER (OUTPATIENT)
Dept: RADIATION THERAPY | Age: 75
Discharge: HOME | End: 2020-10-22
Payer: MEDICARE

## 2020-10-22 PROCEDURE — 77385 HC IMRT TRMT DLVR SMPL: CPT

## 2020-10-23 ENCOUNTER — HOSPITAL ENCOUNTER (OUTPATIENT)
Dept: RADIATION THERAPY | Age: 75
Discharge: HOME | End: 2020-10-23
Payer: MEDICARE

## 2020-10-23 PROCEDURE — 77385 HC IMRT TRMT DLVR SMPL: CPT

## 2020-10-26 ENCOUNTER — HOSPITAL ENCOUNTER (OUTPATIENT)
Dept: RADIATION THERAPY | Age: 75
Discharge: HOME | End: 2020-10-26
Payer: MEDICARE

## 2020-10-26 PROCEDURE — 77336 RADIATION PHYSICS CONSULT: CPT

## 2020-10-26 PROCEDURE — 77385 HC IMRT TRMT DLVR SMPL: CPT

## 2020-10-27 ENCOUNTER — HOSPITAL ENCOUNTER (OUTPATIENT)
Dept: RADIATION THERAPY | Age: 75
Discharge: HOME | End: 2020-10-27
Payer: MEDICARE

## 2020-10-27 PROCEDURE — 77385 HC IMRT TRMT DLVR SMPL: CPT

## 2020-10-28 ENCOUNTER — HOSPITAL ENCOUNTER (OUTPATIENT)
Dept: RADIATION THERAPY | Age: 75
Discharge: HOME | End: 2020-10-28
Payer: MEDICARE

## 2020-10-28 PROCEDURE — 77385 HC IMRT TRMT DLVR SMPL: CPT

## 2020-10-29 ENCOUNTER — HOSPITAL ENCOUNTER (OUTPATIENT)
Dept: RADIATION THERAPY | Age: 75
Discharge: HOME | End: 2020-10-29
Payer: MEDICARE

## 2020-10-29 PROCEDURE — 77385 HC IMRT TRMT DLVR SMPL: CPT

## 2020-10-30 ENCOUNTER — HOSPITAL ENCOUNTER (OUTPATIENT)
Dept: RADIATION THERAPY | Age: 75
Discharge: HOME | End: 2020-10-30
Payer: MEDICARE

## 2020-10-30 PROCEDURE — 77385 HC IMRT TRMT DLVR SMPL: CPT

## 2020-11-02 ENCOUNTER — HOSPITAL ENCOUNTER (OUTPATIENT)
Dept: RADIATION THERAPY | Age: 75
Discharge: HOME | End: 2020-11-02
Payer: MEDICARE

## 2020-11-02 PROCEDURE — 77385 HC IMRT TRMT DLVR SMPL: CPT

## 2020-11-02 PROCEDURE — 77336 RADIATION PHYSICS CONSULT: CPT

## 2020-11-03 ENCOUNTER — HOSPITAL ENCOUNTER (OUTPATIENT)
Dept: RADIATION THERAPY | Age: 75
Discharge: HOME | End: 2020-11-03
Payer: MEDICARE

## 2020-11-03 PROCEDURE — 77385 HC IMRT TRMT DLVR SMPL: CPT

## 2020-11-04 ENCOUNTER — HOSPITAL ENCOUNTER (OUTPATIENT)
Dept: RADIATION THERAPY | Age: 75
Discharge: HOME | End: 2020-11-04
Payer: MEDICARE

## 2020-11-05 ENCOUNTER — HOSPITAL ENCOUNTER (OUTPATIENT)
Dept: RADIATION THERAPY | Age: 75
Discharge: HOME | End: 2020-11-05
Payer: MEDICARE

## 2020-11-05 PROCEDURE — 77385 HC IMRT TRMT DLVR SMPL: CPT

## 2020-11-06 ENCOUNTER — HOSPITAL ENCOUNTER (OUTPATIENT)
Dept: RADIATION THERAPY | Age: 75
Discharge: HOME | End: 2020-11-06
Payer: MEDICARE

## 2020-11-06 PROCEDURE — 77385 HC IMRT TRMT DLVR SMPL: CPT

## 2020-11-09 ENCOUNTER — HOSPITAL ENCOUNTER (OUTPATIENT)
Dept: RADIATION THERAPY | Age: 75
Discharge: HOME | End: 2020-11-09
Payer: MEDICARE

## 2020-11-09 PROCEDURE — 77385 HC IMRT TRMT DLVR SMPL: CPT

## 2020-11-10 ENCOUNTER — HOSPITAL ENCOUNTER (OUTPATIENT)
Dept: RADIATION THERAPY | Age: 75
Discharge: HOME | End: 2020-11-10
Payer: MEDICARE

## 2020-11-10 PROCEDURE — 77385 HC IMRT TRMT DLVR SMPL: CPT

## 2020-11-10 PROCEDURE — 77336 RADIATION PHYSICS CONSULT: CPT

## 2020-11-11 ENCOUNTER — HOSPITAL ENCOUNTER (OUTPATIENT)
Dept: RADIATION THERAPY | Age: 75
Discharge: HOME | End: 2020-11-11
Payer: MEDICARE

## 2020-11-11 PROCEDURE — 77385 HC IMRT TRMT DLVR SMPL: CPT

## 2020-11-12 ENCOUNTER — HOSPITAL ENCOUNTER (OUTPATIENT)
Dept: RADIATION THERAPY | Age: 75
Discharge: HOME | End: 2020-11-12
Payer: MEDICARE

## 2020-11-12 PROCEDURE — 77385 HC IMRT TRMT DLVR SMPL: CPT

## 2020-11-13 ENCOUNTER — HOSPITAL ENCOUNTER (OUTPATIENT)
Dept: RADIATION THERAPY | Age: 75
Discharge: HOME | End: 2020-11-13
Payer: MEDICARE

## 2020-11-13 PROCEDURE — 77385 HC IMRT TRMT DLVR SMPL: CPT

## 2020-11-16 ENCOUNTER — HOSPITAL ENCOUNTER (OUTPATIENT)
Dept: RADIATION THERAPY | Age: 75
Discharge: HOME | End: 2020-11-16
Payer: MEDICARE

## 2020-11-16 PROCEDURE — 77385 HC IMRT TRMT DLVR SMPL: CPT

## 2020-11-17 ENCOUNTER — HOSPITAL ENCOUNTER (OUTPATIENT)
Dept: RADIATION THERAPY | Age: 75
Discharge: HOME | End: 2020-11-17
Payer: MEDICARE

## 2020-11-17 PROCEDURE — 77385 HC IMRT TRMT DLVR SMPL: CPT

## 2020-11-18 ENCOUNTER — HOSPITAL ENCOUNTER (OUTPATIENT)
Dept: RADIATION THERAPY | Age: 75
Discharge: HOME | End: 2020-11-18
Payer: MEDICARE

## 2020-11-19 ENCOUNTER — HOSPITAL ENCOUNTER (OUTPATIENT)
Dept: RADIATION THERAPY | Age: 75
Discharge: HOME | End: 2020-11-19
Payer: MEDICARE

## 2020-11-20 ENCOUNTER — HOSPITAL ENCOUNTER (OUTPATIENT)
Dept: RADIATION THERAPY | Age: 75
Discharge: HOME | End: 2020-11-20
Payer: MEDICARE

## 2020-11-25 ENCOUNTER — HOSPITAL ENCOUNTER (OUTPATIENT)
Dept: LAB | Age: 75
Discharge: HOME | End: 2020-11-25
Payer: MEDICARE

## 2020-11-25 ENCOUNTER — TRANSCRIBE ORDER (OUTPATIENT)
Dept: RADIATION THERAPY | Age: 75
End: 2020-11-25

## 2020-11-25 DIAGNOSIS — C61 MALIGNANT NEOPLASM OF PROSTATE (HCC): Primary | ICD-10-CM

## 2020-11-25 DIAGNOSIS — C61 MALIGNANT NEOPLASM OF PROSTATE (HCC): ICD-10-CM

## 2020-11-25 PROCEDURE — 87086 URINE CULTURE/COLONY COUNT: CPT

## 2020-11-27 LAB
BACTERIA SPEC CULT: NORMAL
SERVICE CMNT-IMP: NORMAL

## 2020-12-30 ENCOUNTER — TRANSCRIBE ORDER (OUTPATIENT)
Dept: RADIATION THERAPY | Age: 75
End: 2020-12-30

## 2020-12-30 ENCOUNTER — HOSPITAL ENCOUNTER (OUTPATIENT)
Dept: RADIATION THERAPY | Age: 75
Discharge: HOME | End: 2020-12-30
Payer: MEDICARE

## 2020-12-30 DIAGNOSIS — C61 MALIGNANT NEOPLASM OF PROSTATE (HCC): Primary | ICD-10-CM

## 2020-12-30 PROCEDURE — 99211 OFF/OP EST MAY X REQ PHY/QHP: CPT

## 2021-05-13 ENCOUNTER — HOSPITAL ENCOUNTER (OUTPATIENT)
Dept: LAB | Age: 76
Discharge: HOME | End: 2021-05-13
Payer: MEDICARE

## 2021-05-13 DIAGNOSIS — C61 MALIGNANT NEOPLASM OF PROSTATE (HCC): ICD-10-CM

## 2021-05-13 LAB — PSA SERPL-MCNC: 0 NG/ML (ref 0–4)

## 2021-05-13 PROCEDURE — 84403 ASSAY OF TOTAL TESTOSTERONE: CPT

## 2021-05-13 PROCEDURE — 84153 ASSAY OF PSA TOTAL: CPT

## 2021-05-13 PROCEDURE — 36415 COLL VENOUS BLD VENIPUNCTURE: CPT

## 2021-05-15 LAB
COMMENT, TESC2: ABNORMAL
TESTOST SERPL-MCNC: <3 NG/DL (ref 264–916)

## 2021-05-19 ENCOUNTER — APPOINTMENT (OUTPATIENT)
Dept: RADIATION THERAPY | Age: 76
End: 2021-05-19

## 2021-06-23 ENCOUNTER — HOSPITAL ENCOUNTER (OUTPATIENT)
Dept: RADIATION THERAPY | Age: 76
Discharge: HOME | End: 2021-06-23
Payer: MEDICARE

## 2021-06-23 PROCEDURE — 99211 OFF/OP EST MAY X REQ PHY/QHP: CPT

## 2022-06-14 ENCOUNTER — TRANSCRIBE ORDER (OUTPATIENT)
Dept: RADIATION THERAPY | Age: 77
End: 2022-06-14

## 2022-06-14 DIAGNOSIS — C61 MALIGNANT NEOPLASM OF PROSTATE (HCC): Primary | ICD-10-CM
